# Patient Record
Sex: MALE | Race: WHITE | URBAN - NONMETROPOLITAN AREA
[De-identification: names, ages, dates, MRNs, and addresses within clinical notes are randomized per-mention and may not be internally consistent; named-entity substitution may affect disease eponyms.]

---

## 2017-01-01 ENCOUNTER — APPOINTMENT (OUTPATIENT)
Dept: SPEECH THERAPY | Facility: HOSPITAL | Age: 82
DRG: 193 | End: 2017-01-01
Payer: MEDICARE

## 2017-01-01 ENCOUNTER — APPOINTMENT (OUTPATIENT)
Dept: GENERAL RADIOLOGY | Facility: HOSPITAL | Age: 82
DRG: 193 | End: 2017-01-01
Payer: MEDICARE

## 2017-01-01 ENCOUNTER — TRANSFERRED RECORDS (OUTPATIENT)
Dept: HEALTH INFORMATION MANAGEMENT | Facility: HOSPITAL | Age: 82
End: 2017-01-01

## 2017-01-01 ENCOUNTER — HOSPITAL ENCOUNTER (INPATIENT)
Facility: HOSPITAL | Age: 82
LOS: 11 days | Discharge: ANOTHER HOSPITAL | DRG: 193 | End: 2017-03-24
Attending: PHYSICIAN ASSISTANT | Admitting: INTERNAL MEDICINE
Payer: MEDICARE

## 2017-01-01 ENCOUNTER — APPOINTMENT (OUTPATIENT)
Dept: SPEECH THERAPY | Facility: HOSPITAL | Age: 82
DRG: 193 | End: 2017-01-01
Attending: INTERNAL MEDICINE
Payer: MEDICARE

## 2017-01-01 VITALS
HEART RATE: 80 BPM | HEIGHT: 70 IN | DIASTOLIC BLOOD PRESSURE: 51 MMHG | OXYGEN SATURATION: 90 % | BODY MASS INDEX: 12.37 KG/M2 | SYSTOLIC BLOOD PRESSURE: 99 MMHG | WEIGHT: 86.42 LBS | TEMPERATURE: 99.2 F | RESPIRATION RATE: 18 BRPM

## 2017-01-01 DIAGNOSIS — J43.9 EMPHYSEMA, UNSPECIFIED (H): ICD-10-CM

## 2017-01-01 DIAGNOSIS — E46 MALNUTRITION (H): ICD-10-CM

## 2017-01-01 DIAGNOSIS — J15.9 COMMUNITY ACQUIRED BACTERIAL PNEUMONIA: ICD-10-CM

## 2017-01-01 LAB
ACID FAST STN SPEC QL: NORMAL
ALBUMIN SERPL-MCNC: 1.3 G/DL (ref 3.4–5)
ALBUMIN SERPL-MCNC: 1.5 G/DL (ref 3.4–5)
ALBUMIN SERPL-MCNC: 2 G/DL (ref 3.4–5)
ALBUMIN UR-MCNC: 30 MG/DL
ALP SERPL-CCNC: 60 U/L (ref 40–150)
ALP SERPL-CCNC: 74 U/L (ref 40–150)
ALP SERPL-CCNC: 94 U/L (ref 40–150)
ALT SERPL W P-5'-P-CCNC: 11 U/L (ref 0–70)
ALT SERPL W P-5'-P-CCNC: 12 U/L (ref 0–70)
ALT SERPL W P-5'-P-CCNC: 8 U/L (ref 0–70)
ANION GAP SERPL CALCULATED.3IONS-SCNC: 2 MMOL/L (ref 3–14)
ANION GAP SERPL CALCULATED.3IONS-SCNC: 2 MMOL/L (ref 3–14)
ANION GAP SERPL CALCULATED.3IONS-SCNC: 3 MMOL/L (ref 3–14)
ANION GAP SERPL CALCULATED.3IONS-SCNC: 5 MMOL/L (ref 3–14)
ANION GAP SERPL CALCULATED.3IONS-SCNC: 8 MMOL/L (ref 3–14)
APPEARANCE UR: ABNORMAL
APTT PPP: 31 SEC (ref 24–37)
APTT PPP: 32 SEC (ref 24–37)
AST SERPL W P-5'-P-CCNC: 13 U/L (ref 0–45)
AST SERPL W P-5'-P-CCNC: 13 U/L (ref 0–45)
AST SERPL W P-5'-P-CCNC: 16 U/L (ref 0–45)
B DERMAT AB SER QL ID: NORMAL
BACTERIA #/AREA URNS HPF: ABNORMAL /HPF
BACTERIA SPEC CULT: ABNORMAL
BACTERIA SPEC CULT: ABNORMAL
BACTERIA SPEC CULT: NORMAL
BASOPHILS # BLD AUTO: 0 10E9/L (ref 0–0.2)
BASOPHILS NFR BLD AUTO: 0 %
BASOPHILS NFR BLD AUTO: 0.1 %
BASOPHILS NFR BLD AUTO: 0.2 %
BASOPHILS NFR BLD AUTO: 0.3 %
BASOPHILS NFR BLD AUTO: 0.3 %
BILIRUB DIRECT SERPL-MCNC: <0.1 MG/DL (ref 0–0.2)
BILIRUB SERPL-MCNC: 0.1 MG/DL (ref 0.2–1.3)
BILIRUB SERPL-MCNC: 0.1 MG/DL (ref 0.2–1.3)
BILIRUB SERPL-MCNC: 0.3 MG/DL (ref 0.2–1.3)
BILIRUB UR QL STRIP: NEGATIVE
BUN SERPL-MCNC: 10 MG/DL (ref 7–30)
BUN SERPL-MCNC: 12 MG/DL (ref 7–30)
BUN SERPL-MCNC: 25 MG/DL (ref 7–30)
BUN SERPL-MCNC: 34 MG/DL (ref 7–30)
BUN SERPL-MCNC: 9 MG/DL (ref 7–30)
CALCIUM SERPL-MCNC: 8 MG/DL (ref 8.5–10.1)
CALCIUM SERPL-MCNC: 8.1 MG/DL (ref 8.5–10.1)
CALCIUM SERPL-MCNC: 8.2 MG/DL (ref 8.5–10.1)
CALCIUM SERPL-MCNC: 8.2 MG/DL (ref 8.5–10.1)
CALCIUM SERPL-MCNC: 8.5 MG/DL (ref 8.5–10.1)
CHLORIDE SERPL-SCNC: 100 MMOL/L (ref 94–109)
CHLORIDE SERPL-SCNC: 100 MMOL/L (ref 94–109)
CHLORIDE SERPL-SCNC: 106 MMOL/L (ref 94–109)
CHLORIDE SERPL-SCNC: 92 MMOL/L (ref 94–109)
CHLORIDE SERPL-SCNC: 93 MMOL/L (ref 94–109)
CK SERPL-CCNC: 32 U/L (ref 30–300)
CO2 SERPL-SCNC: 33 MMOL/L (ref 20–32)
CO2 SERPL-SCNC: 34 MMOL/L (ref 20–32)
CO2 SERPL-SCNC: 38 MMOL/L (ref 20–32)
CO2 SERPL-SCNC: 38 MMOL/L (ref 20–32)
CO2 SERPL-SCNC: 39 MMOL/L (ref 20–32)
COLOR UR AUTO: YELLOW
COPATH REPORT: NORMAL
CREAT SERPL-MCNC: 0.43 MG/DL (ref 0.66–1.25)
CREAT SERPL-MCNC: 0.45 MG/DL (ref 0.66–1.25)
CREAT SERPL-MCNC: 0.47 MG/DL (ref 0.66–1.25)
CREAT SERPL-MCNC: 0.51 MG/DL (ref 0.66–1.25)
CREAT SERPL-MCNC: 0.58 MG/DL (ref 0.66–1.25)
DIFFERENTIAL METHOD BLD: ABNORMAL
EOSINOPHIL # BLD AUTO: 0 10E9/L (ref 0–0.7)
EOSINOPHIL # BLD AUTO: 0.1 10E9/L (ref 0–0.7)
EOSINOPHIL NFR BLD AUTO: 0 %
EOSINOPHIL NFR BLD AUTO: 0.2 %
EOSINOPHIL NFR BLD AUTO: 0.3 %
EOSINOPHIL NFR BLD AUTO: 0.3 %
EOSINOPHIL NFR BLD AUTO: 0.4 %
EOSINOPHIL NFR BLD AUTO: 0.8 %
ERYTHROCYTE [DISTWIDTH] IN BLOOD BY AUTOMATED COUNT: 15.9 % (ref 10–15)
ERYTHROCYTE [DISTWIDTH] IN BLOOD BY AUTOMATED COUNT: 16 % (ref 10–15)
ERYTHROCYTE [DISTWIDTH] IN BLOOD BY AUTOMATED COUNT: 16 % (ref 10–15)
ERYTHROCYTE [DISTWIDTH] IN BLOOD BY AUTOMATED COUNT: 16.1 % (ref 10–15)
ERYTHROCYTE [DISTWIDTH] IN BLOOD BY AUTOMATED COUNT: 16.2 % (ref 10–15)
ERYTHROCYTE [DISTWIDTH] IN BLOOD BY AUTOMATED COUNT: 16.2 % (ref 10–15)
FERRITIN SERPL-MCNC: 286 NG/ML (ref 26–388)
GFR SERPL CREATININE-BSD FRML MDRD: ABNORMAL ML/MIN/1.7M2
GLUCOSE BLDC GLUCOMTR-MCNC: 125 MG/DL (ref 70–99)
GLUCOSE SERPL-MCNC: 101 MG/DL (ref 70–99)
GLUCOSE SERPL-MCNC: 109 MG/DL (ref 70–99)
GLUCOSE SERPL-MCNC: 119 MG/DL (ref 70–99)
GLUCOSE SERPL-MCNC: 87 MG/DL (ref 70–99)
GLUCOSE SERPL-MCNC: 89 MG/DL (ref 70–99)
GLUCOSE UR STRIP-MCNC: NEGATIVE MG/DL
GRAM STN SPEC: ABNORMAL
HCT VFR BLD AUTO: 25.5 % (ref 40–53)
HCT VFR BLD AUTO: 25.9 % (ref 40–53)
HCT VFR BLD AUTO: 26 % (ref 40–53)
HCT VFR BLD AUTO: 26.3 % (ref 40–53)
HCT VFR BLD AUTO: 27.3 % (ref 40–53)
HCT VFR BLD AUTO: 27.4 % (ref 40–53)
HCT VFR BLD AUTO: 27.8 % (ref 40–53)
HCT VFR BLD AUTO: 27.9 % (ref 40–53)
HCT VFR BLD AUTO: 28.8 % (ref 40–53)
HCT VFR BLD AUTO: 29.7 % (ref 40–53)
HCT VFR BLD AUTO: 33 % (ref 40–53)
HCT VFR BLD AUTO: 34.5 % (ref 40–53)
HGB BLD-MCNC: 10 G/DL (ref 13.3–17.7)
HGB BLD-MCNC: 7.4 G/DL (ref 13.3–17.7)
HGB BLD-MCNC: 7.5 G/DL (ref 13.3–17.7)
HGB BLD-MCNC: 8 G/DL (ref 13.3–17.7)
HGB BLD-MCNC: 8 G/DL (ref 13.3–17.7)
HGB BLD-MCNC: 8.2 G/DL (ref 13.3–17.7)
HGB BLD-MCNC: 8.2 G/DL (ref 13.3–17.7)
HGB BLD-MCNC: 8.5 G/DL (ref 13.3–17.7)
HGB BLD-MCNC: 8.6 G/DL (ref 13.3–17.7)
HGB BLD-MCNC: 9.4 G/DL (ref 13.3–17.7)
HGB UR QL STRIP: ABNORMAL
HIV 1+2 AB+HIV1 P24 AG SERPL QL IA: NORMAL
IMM GRANULOCYTES # BLD: 0 10E9/L (ref 0–0.4)
IMM GRANULOCYTES # BLD: 0.1 10E9/L (ref 0–0.4)
IMM GRANULOCYTES # BLD: 0.1 10E9/L (ref 0–0.4)
IMM GRANULOCYTES NFR BLD: 0.2 %
IMM GRANULOCYTES NFR BLD: 0.3 %
IMM GRANULOCYTES NFR BLD: 0.4 %
IMM GRANULOCYTES NFR BLD: 0.5 %
INR PPP: 1.25 (ref 0.8–1.2)
INR PPP: 1.3 (ref 0.8–1.2)
IRON SATN MFR SERPL: 11 % (ref 15–46)
IRON SERPL-MCNC: 12 UG/DL (ref 35–180)
KETONES UR STRIP-MCNC: NEGATIVE MG/DL
L PNEUMO1 AG UR QL IA: NORMAL
LAB SCANNED RESULT: NORMAL
LACTATE SERPL-SCNC: 1.1 MMOL/L (ref 0.4–2)
LEUKOCYTE ESTERASE UR QL STRIP: ABNORMAL
LYMPHOCYTES # BLD AUTO: 0.6 10E9/L (ref 0.8–5.3)
LYMPHOCYTES # BLD AUTO: 0.7 10E9/L (ref 0.8–5.3)
LYMPHOCYTES # BLD AUTO: 0.8 10E9/L (ref 0.8–5.3)
LYMPHOCYTES # BLD AUTO: 0.9 10E9/L (ref 0.8–5.3)
LYMPHOCYTES # BLD AUTO: 1 10E9/L (ref 0.8–5.3)
LYMPHOCYTES NFR BLD AUTO: 10.3 %
LYMPHOCYTES NFR BLD AUTO: 10.7 %
LYMPHOCYTES NFR BLD AUTO: 12.4 %
LYMPHOCYTES NFR BLD AUTO: 12.5 %
LYMPHOCYTES NFR BLD AUTO: 12.7 %
LYMPHOCYTES NFR BLD AUTO: 13.6 %
LYMPHOCYTES NFR BLD AUTO: 13.7 %
LYMPHOCYTES NFR BLD AUTO: 5.7 %
LYMPHOCYTES NFR BLD AUTO: 6.7 %
LYMPHOCYTES NFR BLD AUTO: 7.2 %
Lab: NORMAL
M TB DNA SPEC QL NAA+PROBE: NORMAL
M TB TUBERC IFN-G BLD QL: NEGATIVE
M TB TUBERC IFN-G/MITOGEN IGNF BLD: 0 IU/ML
MAGNESIUM SERPL-MCNC: 2.4 MG/DL (ref 1.6–2.3)
MCH RBC QN AUTO: 24.9 PG (ref 26.5–33)
MCH RBC QN AUTO: 25.1 PG (ref 26.5–33)
MCH RBC QN AUTO: 25.2 PG (ref 26.5–33)
MCH RBC QN AUTO: 25.3 PG (ref 26.5–33)
MCH RBC QN AUTO: 25.5 PG (ref 26.5–33)
MCH RBC QN AUTO: 25.6 PG (ref 26.5–33)
MCH RBC QN AUTO: 25.6 PG (ref 26.5–33)
MCH RBC QN AUTO: 25.7 PG (ref 26.5–33)
MCH RBC QN AUTO: 25.7 PG (ref 26.5–33)
MCH RBC QN AUTO: 25.8 PG (ref 26.5–33)
MCH RBC QN AUTO: 26 PG (ref 26.5–33)
MCH RBC QN AUTO: 26.1 PG (ref 26.5–33)
MCHC RBC AUTO-ENTMCNC: 28.5 G/DL (ref 31.5–36.5)
MCHC RBC AUTO-ENTMCNC: 28.5 G/DL (ref 31.5–36.5)
MCHC RBC AUTO-ENTMCNC: 28.6 G/DL (ref 31.5–36.5)
MCHC RBC AUTO-ENTMCNC: 28.6 G/DL (ref 31.5–36.5)
MCHC RBC AUTO-ENTMCNC: 28.8 G/DL (ref 31.5–36.5)
MCHC RBC AUTO-ENTMCNC: 29 G/DL (ref 31.5–36.5)
MCHC RBC AUTO-ENTMCNC: 29.2 G/DL (ref 31.5–36.5)
MCHC RBC AUTO-ENTMCNC: 29.3 G/DL (ref 31.5–36.5)
MCHC RBC AUTO-ENTMCNC: 29.4 G/DL (ref 31.5–36.5)
MCHC RBC AUTO-ENTMCNC: 29.4 G/DL (ref 31.5–36.5)
MCHC RBC AUTO-ENTMCNC: 29.5 G/DL (ref 31.5–36.5)
MCHC RBC AUTO-ENTMCNC: 29.9 G/DL (ref 31.5–36.5)
MCV RBC AUTO: 85 FL (ref 78–100)
MCV RBC AUTO: 86 FL (ref 78–100)
MCV RBC AUTO: 86 FL (ref 78–100)
MCV RBC AUTO: 87 FL (ref 78–100)
MCV RBC AUTO: 87 FL (ref 78–100)
MCV RBC AUTO: 88 FL (ref 78–100)
MCV RBC AUTO: 89 FL (ref 78–100)
MCV RBC AUTO: 90 FL (ref 78–100)
MICRO REPORT STATUS: ABNORMAL
MICRO REPORT STATUS: NORMAL
MONOCYTES # BLD AUTO: 0.4 10E9/L (ref 0–1.3)
MONOCYTES # BLD AUTO: 0.4 10E9/L (ref 0–1.3)
MONOCYTES # BLD AUTO: 0.5 10E9/L (ref 0–1.3)
MONOCYTES # BLD AUTO: 0.6 10E9/L (ref 0–1.3)
MONOCYTES # BLD AUTO: 0.8 10E9/L (ref 0–1.3)
MONOCYTES NFR BLD AUTO: 4.7 %
MONOCYTES NFR BLD AUTO: 5.6 %
MONOCYTES NFR BLD AUTO: 6.1 %
MONOCYTES NFR BLD AUTO: 6.5 %
MONOCYTES NFR BLD AUTO: 7 %
MONOCYTES NFR BLD AUTO: 7.1 %
MONOCYTES NFR BLD AUTO: 7.3 %
MONOCYTES NFR BLD AUTO: 7.4 %
MONOCYTES NFR BLD AUTO: 7.7 %
MONOCYTES NFR BLD AUTO: 7.7 %
MUCOUS THREADS #/AREA URNS LPF: PRESENT /LPF
NEUTROPHILS # BLD AUTO: 10.1 10E9/L (ref 1.6–8.3)
NEUTROPHILS # BLD AUTO: 4.3 10E9/L (ref 1.6–8.3)
NEUTROPHILS # BLD AUTO: 4.8 10E9/L (ref 1.6–8.3)
NEUTROPHILS # BLD AUTO: 5 10E9/L (ref 1.6–8.3)
NEUTROPHILS # BLD AUTO: 5.1 10E9/L (ref 1.6–8.3)
NEUTROPHILS # BLD AUTO: 5.7 10E9/L (ref 1.6–8.3)
NEUTROPHILS # BLD AUTO: 5.9 10E9/L (ref 1.6–8.3)
NEUTROPHILS # BLD AUTO: 6 10E9/L (ref 1.6–8.3)
NEUTROPHILS # BLD AUTO: 8 10E9/L (ref 1.6–8.3)
NEUTROPHILS # BLD AUTO: 9.2 10E9/L (ref 1.6–8.3)
NEUTROPHILS NFR BLD AUTO: 77.7 %
NEUTROPHILS NFR BLD AUTO: 78.6 %
NEUTROPHILS NFR BLD AUTO: 79.1 %
NEUTROPHILS NFR BLD AUTO: 79.4 %
NEUTROPHILS NFR BLD AUTO: 79.5 %
NEUTROPHILS NFR BLD AUTO: 80.5 %
NEUTROPHILS NFR BLD AUTO: 82.3 %
NEUTROPHILS NFR BLD AUTO: 86.7 %
NEUTROPHILS NFR BLD AUTO: 86.9 %
NEUTROPHILS NFR BLD AUTO: 87.3 %
NITRATE UR QL: POSITIVE
NRBC # BLD AUTO: 0 10*3/UL
NRBC BLD AUTO-RTO: 0 /100
PH UR STRIP: 5.5 PH (ref 4.7–8)
PHOSPHATE SERPL-MCNC: 3.1 MG/DL (ref 2.5–4.5)
PLATELET # BLD AUTO: 199 10E9/L (ref 150–450)
PLATELET # BLD AUTO: 204 10E9/L (ref 150–450)
PLATELET # BLD AUTO: 210 10E9/L (ref 150–450)
PLATELET # BLD AUTO: 232 10E9/L (ref 150–450)
PLATELET # BLD AUTO: 236 10E9/L (ref 150–450)
PLATELET # BLD AUTO: 251 10E9/L (ref 150–450)
PLATELET # BLD AUTO: 257 10E9/L (ref 150–450)
PLATELET # BLD AUTO: 257 10E9/L (ref 150–450)
PLATELET # BLD AUTO: 281 10E9/L (ref 150–450)
PLATELET # BLD AUTO: 287 10E9/L (ref 150–450)
PLATELET # BLD AUTO: 297 10E9/L (ref 150–450)
PLATELET # BLD AUTO: 324 10E9/L (ref 150–450)
POTASSIUM SERPL-SCNC: 4 MMOL/L (ref 3.4–5.3)
POTASSIUM SERPL-SCNC: 4.3 MMOL/L (ref 3.4–5.3)
POTASSIUM SERPL-SCNC: 5.1 MMOL/L (ref 3.4–5.3)
PROCALCITONIN SERPL-MCNC: 1.3 NG/ML
PROT SERPL-MCNC: 5.7 G/DL (ref 6.8–8.8)
PROT SERPL-MCNC: 6.4 G/DL (ref 6.8–8.8)
PROT SERPL-MCNC: 8 G/DL (ref 6.8–8.8)
RBC # BLD AUTO: 2.88 10E12/L (ref 4.4–5.9)
RBC # BLD AUTO: 2.89 10E12/L (ref 4.4–5.9)
RBC # BLD AUTO: 2.94 10E12/L (ref 4.4–5.9)
RBC # BLD AUTO: 3.01 10E12/L (ref 4.4–5.9)
RBC # BLD AUTO: 3.13 10E12/L (ref 4.4–5.9)
RBC # BLD AUTO: 3.14 10E12/L (ref 4.4–5.9)
RBC # BLD AUTO: 3.19 10E12/L (ref 4.4–5.9)
RBC # BLD AUTO: 3.2 10E12/L (ref 4.4–5.9)
RBC # BLD AUTO: 3.34 10E12/L (ref 4.4–5.9)
RBC # BLD AUTO: 3.37 10E12/L (ref 4.4–5.9)
RBC # BLD AUTO: 3.71 10E12/L (ref 4.4–5.9)
RBC # BLD AUTO: 3.87 10E12/L (ref 4.4–5.9)
RBC #/AREA URNS AUTO: 71 /HPF (ref 0–2)
S PNEUM AG SPEC QL: NORMAL
SODIUM SERPL-SCNC: 133 MMOL/L (ref 133–144)
SODIUM SERPL-SCNC: 134 MMOL/L (ref 133–144)
SODIUM SERPL-SCNC: 140 MMOL/L (ref 133–144)
SODIUM SERPL-SCNC: 142 MMOL/L (ref 133–144)
SODIUM SERPL-SCNC: 144 MMOL/L (ref 133–144)
SP GR UR STRIP: 1.04 (ref 1–1.03)
SPECIMEN SOURCE: ABNORMAL
SPECIMEN SOURCE: NORMAL
TIBC SERPL-MCNC: 112 UG/DL (ref 240–430)
TSH SERPL DL<=0.05 MIU/L-ACNC: 3.34 MU/L (ref 0.4–4)
URN SPEC COLLECT METH UR: ABNORMAL
UROBILINOGEN UR STRIP-MCNC: NORMAL MG/DL (ref 0–2)
WBC # BLD AUTO: 10.5 10E9/L (ref 4–11)
WBC # BLD AUTO: 11.7 10E9/L (ref 4–11)
WBC # BLD AUTO: 5.6 10E9/L (ref 4–11)
WBC # BLD AUTO: 6.1 10E9/L (ref 4–11)
WBC # BLD AUTO: 6.3 10E9/L (ref 4–11)
WBC # BLD AUTO: 6.5 10E9/L (ref 4–11)
WBC # BLD AUTO: 7.1 10E9/L (ref 4–11)
WBC # BLD AUTO: 7.2 10E9/L (ref 4–11)
WBC # BLD AUTO: 7.2 10E9/L (ref 4–11)
WBC # BLD AUTO: 7.6 10E9/L (ref 4–11)
WBC # BLD AUTO: 8 10E9/L (ref 4–11)
WBC # BLD AUTO: 9.2 10E9/L (ref 4–11)
WBC #/AREA URNS AUTO: 60 /HPF (ref 0–2)
WBC CLUMPS #/AREA URNS HPF: PRESENT /HPF

## 2017-01-01 PROCEDURE — 94640 AIRWAY INHALATION TREATMENT: CPT

## 2017-01-01 PROCEDURE — 36415 COLL VENOUS BLD VENIPUNCTURE: CPT | Performed by: INTERNAL MEDICINE

## 2017-01-01 PROCEDURE — 94640 AIRWAY INHALATION TREATMENT: CPT | Mod: 76

## 2017-01-01 PROCEDURE — 25000132 ZZH RX MED GY IP 250 OP 250 PS 637: Mod: GY | Performed by: INTERNAL MEDICINE

## 2017-01-01 PROCEDURE — 25800025 ZZH RX 258: Performed by: INTERNAL MEDICINE

## 2017-01-01 PROCEDURE — 71260 CT THORAX DX C+: CPT | Mod: TC

## 2017-01-01 PROCEDURE — 92526 ORAL FUNCTION THERAPY: CPT | Mod: GN

## 2017-01-01 PROCEDURE — 40000225 ZZH STATISTIC SLP WARD VISIT

## 2017-01-01 PROCEDURE — 87385 HISTOPLASMA CAPSUL AG IA: CPT | Performed by: INTERNAL MEDICINE

## 2017-01-01 PROCEDURE — 40000275 ZZH STATISTIC RCP TIME EA 10 MIN

## 2017-01-01 PROCEDURE — 96374 THER/PROPH/DIAG INJ IV PUSH: CPT

## 2017-01-01 PROCEDURE — 74177 CT ABD & PELVIS W/CONTRAST: CPT | Mod: TC

## 2017-01-01 PROCEDURE — 99223 1ST HOSP IP/OBS HIGH 75: CPT | Performed by: INTERNAL MEDICINE

## 2017-01-01 PROCEDURE — 12000000 ZZH R&B MED SURG/OB

## 2017-01-01 PROCEDURE — 85025 COMPLETE CBC W/AUTO DIFF WBC: CPT | Performed by: INTERNAL MEDICINE

## 2017-01-01 PROCEDURE — 99232 SBSQ HOSP IP/OBS MODERATE 35: CPT | Performed by: INTERNAL MEDICINE

## 2017-01-01 PROCEDURE — 80053 COMPREHEN METABOLIC PANEL: CPT | Performed by: INTERNAL MEDICINE

## 2017-01-01 PROCEDURE — 80048 BASIC METABOLIC PNL TOTAL CA: CPT | Performed by: FAMILY MEDICINE

## 2017-01-01 PROCEDURE — 88173 CYTOPATH EVAL FNA REPORT: CPT | Mod: TC | Performed by: INTERNAL MEDICINE

## 2017-01-01 PROCEDURE — 85610 PROTHROMBIN TIME: CPT | Performed by: INTERNAL MEDICINE

## 2017-01-01 PROCEDURE — 25000128 H RX IP 250 OP 636: Performed by: INTERNAL MEDICINE

## 2017-01-01 PROCEDURE — 25000125 ZZHC RX 250: Performed by: INTERNAL MEDICINE

## 2017-01-01 PROCEDURE — 20000003 ZZH R&B ICU

## 2017-01-01 PROCEDURE — 00000146 ZZHCL STATISTIC GLUCOSE BY METER IP

## 2017-01-01 PROCEDURE — 96361 HYDRATE IV INFUSION ADD-ON: CPT

## 2017-01-01 PROCEDURE — 86480 TB TEST CELL IMMUN MEASURE: CPT | Performed by: INTERNAL MEDICINE

## 2017-01-01 PROCEDURE — 85025 COMPLETE CBC W/AUTO DIFF WBC: CPT | Performed by: FAMILY MEDICINE

## 2017-01-01 PROCEDURE — 84145 PROCALCITONIN (PCT): CPT | Performed by: INTERNAL MEDICINE

## 2017-01-01 PROCEDURE — 87040 BLOOD CULTURE FOR BACTERIA: CPT | Performed by: PHYSICIAN ASSISTANT

## 2017-01-01 PROCEDURE — 77012 CT SCAN FOR NEEDLE BIOPSY: CPT | Mod: TC

## 2017-01-01 PROCEDURE — 0B9G3ZX DRAINAGE OF LEFT UPPER LUNG LOBE, PERCUTANEOUS APPROACH, DIAGNOSTIC: ICD-10-PCS | Performed by: RADIOLOGY

## 2017-01-01 PROCEDURE — 87556 M.TUBERCULO DNA AMP PROBE: CPT | Performed by: INTERNAL MEDICINE

## 2017-01-01 PROCEDURE — 36415 COLL VENOUS BLD VENIPUNCTURE: CPT | Performed by: PHYSICIAN ASSISTANT

## 2017-01-01 PROCEDURE — 80076 HEPATIC FUNCTION PANEL: CPT | Performed by: INTERNAL MEDICINE

## 2017-01-01 PROCEDURE — 40000786 ZZHCL STATISTIC ACTIVE MRSA SURVEILLANCE CULTURE: Performed by: INTERNAL MEDICINE

## 2017-01-01 PROCEDURE — 80048 BASIC METABOLIC PNL TOTAL CA: CPT | Performed by: INTERNAL MEDICINE

## 2017-01-01 PROCEDURE — 85730 THROMBOPLASTIN TIME PARTIAL: CPT | Performed by: INTERNAL MEDICINE

## 2017-01-01 PROCEDURE — 99239 HOSP IP/OBS DSCHRG MGMT >30: CPT | Performed by: INTERNAL MEDICINE

## 2017-01-01 PROCEDURE — 87899 AGENT NOS ASSAY W/OPTIC: CPT | Performed by: INTERNAL MEDICINE

## 2017-01-01 PROCEDURE — 71010 ZZHC CHEST ONE VIEW: CPT | Mod: TC

## 2017-01-01 PROCEDURE — 40000832 H STATISTIC POST OPERATIVE IMAGING: Mod: TC

## 2017-01-01 PROCEDURE — 87106 FUNGI IDENTIFICATION YEAST: CPT | Performed by: INTERNAL MEDICINE

## 2017-01-01 PROCEDURE — 83550 IRON BINDING TEST: CPT | Performed by: INTERNAL MEDICINE

## 2017-01-01 PROCEDURE — 32405 ZZHC BIOPSY LUNG/MEDIASTINUM PERCUTANEOUS: CPT | Mod: TC

## 2017-01-01 PROCEDURE — 84443 ASSAY THYROID STIM HORMONE: CPT | Performed by: INTERNAL MEDICINE

## 2017-01-01 PROCEDURE — 83605 ASSAY OF LACTIC ACID: CPT | Performed by: INTERNAL MEDICINE

## 2017-01-01 PROCEDURE — 87086 URINE CULTURE/COLONY COUNT: CPT | Performed by: INTERNAL MEDICINE

## 2017-01-01 PROCEDURE — 87206 SMEAR FLUORESCENT/ACID STAI: CPT | Performed by: INTERNAL MEDICINE

## 2017-01-01 PROCEDURE — 87070 CULTURE OTHR SPECIMN AEROBIC: CPT | Performed by: INTERNAL MEDICINE

## 2017-01-01 PROCEDURE — 36416 COLLJ CAPILLARY BLOOD SPEC: CPT | Performed by: FAMILY MEDICINE

## 2017-01-01 PROCEDURE — 99285 EMERGENCY DEPT VISIT HI MDM: CPT | Performed by: PHYSICIAN ASSISTANT

## 2017-01-01 PROCEDURE — 82728 ASSAY OF FERRITIN: CPT | Performed by: INTERNAL MEDICINE

## 2017-01-01 PROCEDURE — 00000155 ZZHCL STATISTIC H-CELL BLOCK W/STAIN: Performed by: INTERNAL MEDICINE

## 2017-01-01 PROCEDURE — 85027 COMPLETE CBC AUTOMATED: CPT | Performed by: INTERNAL MEDICINE

## 2017-01-01 PROCEDURE — 36415 COLL VENOUS BLD VENIPUNCTURE: CPT | Performed by: RADIOLOGY

## 2017-01-01 PROCEDURE — 81001 URINALYSIS AUTO W/SCOPE: CPT | Performed by: PHYSICIAN ASSISTANT

## 2017-01-01 PROCEDURE — 92610 EVALUATE SWALLOWING FUNCTION: CPT | Mod: GN

## 2017-01-01 PROCEDURE — 86612 BLASTOMYCES ANTIBODY: CPT | Performed by: INTERNAL MEDICINE

## 2017-01-01 PROCEDURE — 88305 TISSUE EXAM BY PATHOLOGIST: CPT | Mod: TC | Performed by: INTERNAL MEDICINE

## 2017-01-01 PROCEDURE — 83540 ASSAY OF IRON: CPT | Performed by: INTERNAL MEDICINE

## 2017-01-01 PROCEDURE — 99285 EMERGENCY DEPT VISIT HI MDM: CPT | Mod: 25

## 2017-01-01 PROCEDURE — 84100 ASSAY OF PHOSPHORUS: CPT | Performed by: INTERNAL MEDICINE

## 2017-01-01 PROCEDURE — 87205 SMEAR GRAM STAIN: CPT | Performed by: INTERNAL MEDICINE

## 2017-01-01 PROCEDURE — 25000128 H RX IP 250 OP 636: Performed by: PHYSICIAN ASSISTANT

## 2017-01-01 PROCEDURE — 85730 THROMBOPLASTIN TIME PARTIAL: CPT | Performed by: RADIOLOGY

## 2017-01-01 PROCEDURE — 82550 ASSAY OF CK (CPK): CPT | Performed by: INTERNAL MEDICINE

## 2017-01-01 PROCEDURE — 25000125 ZZHC RX 250

## 2017-01-01 PROCEDURE — 87116 MYCOBACTERIA CULTURE: CPT | Performed by: INTERNAL MEDICINE

## 2017-01-01 PROCEDURE — 87389 HIV-1 AG W/HIV-1&-2 AB AG IA: CPT | Performed by: INTERNAL MEDICINE

## 2017-01-01 PROCEDURE — 36416 COLLJ CAPILLARY BLOOD SPEC: CPT | Performed by: INTERNAL MEDICINE

## 2017-01-01 PROCEDURE — 83735 ASSAY OF MAGNESIUM: CPT | Performed by: INTERNAL MEDICINE

## 2017-01-01 RX ORDER — LIDOCAINE HYDROCHLORIDE 10 MG/ML
INJECTION, SOLUTION EPIDURAL; INFILTRATION; INTRACAUDAL; PERINEURAL
Status: COMPLETED
Start: 2017-01-01 | End: 2017-01-01

## 2017-01-01 RX ORDER — DEXTROSE MONOHYDRATE, SODIUM CHLORIDE, AND POTASSIUM CHLORIDE 50; 1.49; 4.5 G/1000ML; G/1000ML; G/1000ML
INJECTION, SOLUTION INTRAVENOUS CONTINUOUS
Status: DISCONTINUED | OUTPATIENT
Start: 2017-01-01 | End: 2017-01-01 | Stop reason: HOSPADM

## 2017-01-01 RX ORDER — IOPAMIDOL 612 MG/ML
100 INJECTION, SOLUTION INTRAVASCULAR ONCE
Status: COMPLETED | OUTPATIENT
Start: 2017-01-01 | End: 2017-01-01

## 2017-01-01 RX ORDER — ONDANSETRON 2 MG/ML
4 INJECTION INTRAMUSCULAR; INTRAVENOUS EVERY 6 HOURS PRN
Status: DISCONTINUED | OUTPATIENT
Start: 2017-01-01 | End: 2017-01-01 | Stop reason: HOSPADM

## 2017-01-01 RX ORDER — NALOXONE HYDROCHLORIDE 0.4 MG/ML
.1-.4 INJECTION, SOLUTION INTRAMUSCULAR; INTRAVENOUS; SUBCUTANEOUS
Status: DISCONTINUED | OUTPATIENT
Start: 2017-01-01 | End: 2017-01-01 | Stop reason: HOSPADM

## 2017-01-01 RX ORDER — IPRATROPIUM BROMIDE AND ALBUTEROL SULFATE 2.5; .5 MG/3ML; MG/3ML
3 SOLUTION RESPIRATORY (INHALATION) EVERY 4 HOURS PRN
Status: DISCONTINUED | OUTPATIENT
Start: 2017-01-01 | End: 2017-01-01 | Stop reason: HOSPADM

## 2017-01-01 RX ORDER — MULTIPLE VITAMINS W/ MINERALS TAB 9MG-400MCG
1 TAB ORAL DAILY
Status: DISCONTINUED | OUTPATIENT
Start: 2017-01-01 | End: 2017-01-01 | Stop reason: HOSPADM

## 2017-01-01 RX ORDER — IPRATROPIUM BROMIDE AND ALBUTEROL SULFATE 2.5; .5 MG/3ML; MG/3ML
3 SOLUTION RESPIRATORY (INHALATION)
Status: DISCONTINUED | OUTPATIENT
Start: 2017-01-01 | End: 2017-01-01

## 2017-01-01 RX ORDER — LIDOCAINE 40 MG/G
CREAM TOPICAL
Status: DISCONTINUED | OUTPATIENT
Start: 2017-01-01 | End: 2017-01-01 | Stop reason: HOSPADM

## 2017-01-01 RX ORDER — ACETAMINOPHEN 325 MG/1
650 TABLET ORAL EVERY 4 HOURS PRN
Status: DISCONTINUED | OUTPATIENT
Start: 2017-01-01 | End: 2017-01-01 | Stop reason: HOSPADM

## 2017-01-01 RX ORDER — CEFTRIAXONE SODIUM 2 G/50ML
2 INJECTION, SOLUTION INTRAVENOUS ONCE
Status: COMPLETED | OUTPATIENT
Start: 2017-01-01 | End: 2017-01-01

## 2017-01-01 RX ORDER — LIDOCAINE 40 MG/G
CREAM TOPICAL
Status: DISCONTINUED | OUTPATIENT
Start: 2017-01-01 | End: 2017-01-01

## 2017-01-01 RX ORDER — SODIUM CHLORIDE 9 MG/ML
1000 INJECTION, SOLUTION INTRAVENOUS CONTINUOUS
Status: DISCONTINUED | OUTPATIENT
Start: 2017-01-01 | End: 2017-01-01

## 2017-01-01 RX ORDER — CEFTRIAXONE SODIUM 1 G/50ML
1 INJECTION, SOLUTION INTRAVENOUS EVERY 24 HOURS
Status: DISCONTINUED | OUTPATIENT
Start: 2017-01-01 | End: 2017-01-01

## 2017-01-01 RX ORDER — HEPARIN SODIUM 5000 [USP'U]/.5ML
5000 INJECTION, SOLUTION INTRAVENOUS; SUBCUTANEOUS EVERY 12 HOURS
Status: DISCONTINUED | OUTPATIENT
Start: 2017-01-01 | End: 2017-01-01 | Stop reason: HOSPADM

## 2017-01-01 RX ORDER — ONDANSETRON 4 MG/1
4 TABLET, ORALLY DISINTEGRATING ORAL EVERY 6 HOURS PRN
Status: DISCONTINUED | OUTPATIENT
Start: 2017-01-01 | End: 2017-01-01 | Stop reason: HOSPADM

## 2017-01-01 RX ADMIN — HEPARIN SODIUM 5000 UNITS: 10000 INJECTION, SOLUTION INTRAVENOUS; SUBCUTANEOUS at 11:11

## 2017-01-01 RX ADMIN — CEFTRIAXONE SODIUM 1 G: 1 INJECTION, SOLUTION INTRAVENOUS at 17:23

## 2017-01-01 RX ADMIN — HEPARIN SODIUM 5000 UNITS: 10000 INJECTION, SOLUTION INTRAVENOUS; SUBCUTANEOUS at 09:14

## 2017-01-01 RX ADMIN — POTASSIUM CHLORIDE, DEXTROSE MONOHYDRATE AND SODIUM CHLORIDE: 150; 5; 450 INJECTION, SOLUTION INTRAVENOUS at 03:23

## 2017-01-01 RX ADMIN — HEPARIN SODIUM 5000 UNITS: 10000 INJECTION, SOLUTION INTRAVENOUS; SUBCUTANEOUS at 21:48

## 2017-01-01 RX ADMIN — IOPAMIDOL 100 ML: 612 INJECTION, SOLUTION INTRAVASCULAR at 19:43

## 2017-01-01 RX ADMIN — LIDOCAINE HYDROCHLORIDE 1 ML: 10 INJECTION, SOLUTION EPIDURAL; INFILTRATION; INTRACAUDAL; PERINEURAL at 15:28

## 2017-01-01 RX ADMIN — POTASSIUM CHLORIDE, DEXTROSE MONOHYDRATE AND SODIUM CHLORIDE: 150; 5; 450 INJECTION, SOLUTION INTRAVENOUS at 01:26

## 2017-01-01 RX ADMIN — HEPARIN SODIUM 5000 UNITS: 10000 INJECTION, SOLUTION INTRAVENOUS; SUBCUTANEOUS at 10:48

## 2017-01-01 RX ADMIN — IPRATROPIUM BROMIDE AND ALBUTEROL SULFATE 3 ML: .5; 3 SOLUTION RESPIRATORY (INHALATION) at 07:06

## 2017-01-01 RX ADMIN — IPRATROPIUM BROMIDE AND ALBUTEROL SULFATE 3 ML: .5; 3 SOLUTION RESPIRATORY (INHALATION) at 07:14

## 2017-01-01 RX ADMIN — POTASSIUM CHLORIDE, DEXTROSE MONOHYDRATE AND SODIUM CHLORIDE: 150; 5; 450 INJECTION, SOLUTION INTRAVENOUS at 13:03

## 2017-01-01 RX ADMIN — POTASSIUM CHLORIDE, DEXTROSE MONOHYDRATE AND SODIUM CHLORIDE: 150; 5; 450 INJECTION, SOLUTION INTRAVENOUS at 11:05

## 2017-01-01 RX ADMIN — AZITHROMYCIN MONOHYDRATE 500 MG: 500 INJECTION, POWDER, LYOPHILIZED, FOR SOLUTION INTRAVENOUS at 19:41

## 2017-01-01 RX ADMIN — HEPARIN SODIUM 5000 UNITS: 10000 INJECTION, SOLUTION INTRAVENOUS; SUBCUTANEOUS at 09:50

## 2017-01-01 RX ADMIN — IPRATROPIUM BROMIDE AND ALBUTEROL SULFATE 3 ML: .5; 3 SOLUTION RESPIRATORY (INHALATION) at 19:27

## 2017-01-01 RX ADMIN — HEPARIN SODIUM 5000 UNITS: 10000 INJECTION, SOLUTION INTRAVENOUS; SUBCUTANEOUS at 19:23

## 2017-01-01 RX ADMIN — IPRATROPIUM BROMIDE AND ALBUTEROL SULFATE 3 ML: .5; 3 SOLUTION RESPIRATORY (INHALATION) at 15:09

## 2017-01-01 RX ADMIN — IPRATROPIUM BROMIDE AND ALBUTEROL SULFATE 3 ML: .5; 3 SOLUTION RESPIRATORY (INHALATION) at 10:57

## 2017-01-01 RX ADMIN — IPRATROPIUM BROMIDE AND ALBUTEROL SULFATE 3 ML: .5; 3 SOLUTION RESPIRATORY (INHALATION) at 07:13

## 2017-01-01 RX ADMIN — HEPARIN SODIUM 5000 UNITS: 10000 INJECTION, SOLUTION INTRAVENOUS; SUBCUTANEOUS at 11:22

## 2017-01-01 RX ADMIN — AZITHROMYCIN MONOHYDRATE 500 MG: 500 INJECTION, POWDER, LYOPHILIZED, FOR SOLUTION INTRAVENOUS at 20:55

## 2017-01-01 RX ADMIN — HEPARIN SODIUM 5000 UNITS: 10000 INJECTION, SOLUTION INTRAVENOUS; SUBCUTANEOUS at 23:35

## 2017-01-01 RX ADMIN — IPRATROPIUM BROMIDE AND ALBUTEROL SULFATE 3 ML: .5; 3 SOLUTION RESPIRATORY (INHALATION) at 11:00

## 2017-01-01 RX ADMIN — POTASSIUM CHLORIDE, DEXTROSE MONOHYDRATE AND SODIUM CHLORIDE: 150; 5; 450 INJECTION, SOLUTION INTRAVENOUS at 07:04

## 2017-01-01 RX ADMIN — IPRATROPIUM BROMIDE AND ALBUTEROL SULFATE 3 ML: .5; 3 SOLUTION RESPIRATORY (INHALATION) at 19:32

## 2017-01-01 RX ADMIN — IPRATROPIUM BROMIDE AND ALBUTEROL SULFATE 3 ML: .5; 3 SOLUTION RESPIRATORY (INHALATION) at 13:07

## 2017-01-01 RX ADMIN — IPRATROPIUM BROMIDE AND ALBUTEROL SULFATE 3 ML: .5; 3 SOLUTION RESPIRATORY (INHALATION) at 14:44

## 2017-01-01 RX ADMIN — MULTIPLE VITAMINS W/ MINERALS TAB 1 TABLET: TAB at 10:35

## 2017-01-01 RX ADMIN — HEPARIN SODIUM 5000 UNITS: 10000 INJECTION, SOLUTION INTRAVENOUS; SUBCUTANEOUS at 21:40

## 2017-01-01 RX ADMIN — MULTIPLE VITAMINS W/ MINERALS TAB 1 TABLET: TAB at 09:14

## 2017-01-01 RX ADMIN — IPRATROPIUM BROMIDE AND ALBUTEROL SULFATE 3 ML: .5; 3 SOLUTION RESPIRATORY (INHALATION) at 07:12

## 2017-01-01 RX ADMIN — CEFTRIAXONE SODIUM 1 G: 1 INJECTION, SOLUTION INTRAVENOUS at 16:44

## 2017-01-01 RX ADMIN — IPRATROPIUM BROMIDE AND ALBUTEROL SULFATE 3 ML: .5; 3 SOLUTION RESPIRATORY (INHALATION) at 19:29

## 2017-01-01 RX ADMIN — IPRATROPIUM BROMIDE AND ALBUTEROL SULFATE 3 ML: .5; 3 SOLUTION RESPIRATORY (INHALATION) at 17:25

## 2017-01-01 RX ADMIN — IPRATROPIUM BROMIDE AND ALBUTEROL SULFATE 3 ML: .5; 3 SOLUTION RESPIRATORY (INHALATION) at 21:08

## 2017-01-01 RX ADMIN — HEPARIN SODIUM 5000 UNITS: 10000 INJECTION, SOLUTION INTRAVENOUS; SUBCUTANEOUS at 13:25

## 2017-01-01 RX ADMIN — SODIUM CHLORIDE 1000 ML: 9 INJECTION, SOLUTION INTRAVENOUS at 16:09

## 2017-01-01 RX ADMIN — IPRATROPIUM BROMIDE AND ALBUTEROL SULFATE 3 ML: .5; 3 SOLUTION RESPIRATORY (INHALATION) at 13:55

## 2017-01-01 RX ADMIN — POTASSIUM CHLORIDE, DEXTROSE MONOHYDRATE AND SODIUM CHLORIDE: 150; 5; 450 INJECTION, SOLUTION INTRAVENOUS at 13:43

## 2017-01-01 RX ADMIN — CEFTRIAXONE SODIUM 1 G: 1 INJECTION, SOLUTION INTRAVENOUS at 16:35

## 2017-01-01 RX ADMIN — IPRATROPIUM BROMIDE AND ALBUTEROL SULFATE 3 ML: .5; 3 SOLUTION RESPIRATORY (INHALATION) at 20:22

## 2017-01-01 RX ADMIN — IPRATROPIUM BROMIDE AND ALBUTEROL SULFATE 3 ML: .5; 3 SOLUTION RESPIRATORY (INHALATION) at 08:10

## 2017-01-01 RX ADMIN — AZITHROMYCIN MONOHYDRATE 500 MG: 500 INJECTION, POWDER, LYOPHILIZED, FOR SOLUTION INTRAVENOUS at 18:28

## 2017-01-01 RX ADMIN — MULTIPLE VITAMINS W/ MINERALS TAB 1 TABLET: TAB at 13:24

## 2017-01-01 RX ADMIN — POTASSIUM CHLORIDE, DEXTROSE MONOHYDRATE AND SODIUM CHLORIDE: 150; 5; 450 INJECTION, SOLUTION INTRAVENOUS at 18:23

## 2017-01-01 RX ADMIN — CEFTRIAXONE SODIUM 2 G: 2 INJECTION, SOLUTION INTRAVENOUS at 17:31

## 2017-01-01 RX ADMIN — IPRATROPIUM BROMIDE AND ALBUTEROL SULFATE 3 ML: .5; 3 SOLUTION RESPIRATORY (INHALATION) at 15:01

## 2017-01-01 RX ADMIN — POTASSIUM CHLORIDE, DEXTROSE MONOHYDRATE AND SODIUM CHLORIDE: 150; 5; 450 INJECTION, SOLUTION INTRAVENOUS at 16:10

## 2017-01-01 RX ADMIN — CEFTRIAXONE SODIUM 1 G: 1 INJECTION, SOLUTION INTRAVENOUS at 17:05

## 2017-01-01 RX ADMIN — HEPARIN SODIUM 5000 UNITS: 10000 INJECTION, SOLUTION INTRAVENOUS; SUBCUTANEOUS at 09:56

## 2017-01-01 RX ADMIN — IPRATROPIUM BROMIDE AND ALBUTEROL SULFATE 3 ML: .5; 3 SOLUTION RESPIRATORY (INHALATION) at 16:00

## 2017-01-01 RX ADMIN — IPRATROPIUM BROMIDE AND ALBUTEROL SULFATE 3 ML: .5; 3 SOLUTION RESPIRATORY (INHALATION) at 19:28

## 2017-01-01 RX ADMIN — AZITHROMYCIN MONOHYDRATE 500 MG: 500 INJECTION, POWDER, LYOPHILIZED, FOR SOLUTION INTRAVENOUS at 18:06

## 2017-01-01 RX ADMIN — IPRATROPIUM BROMIDE AND ALBUTEROL SULFATE 3 ML: .5; 3 SOLUTION RESPIRATORY (INHALATION) at 09:16

## 2017-01-01 RX ADMIN — AZITHROMYCIN MONOHYDRATE 500 MG: 500 INJECTION, POWDER, LYOPHILIZED, FOR SOLUTION INTRAVENOUS at 18:46

## 2017-01-01 RX ADMIN — IPRATROPIUM BROMIDE AND ALBUTEROL SULFATE 3 ML: .5; 3 SOLUTION RESPIRATORY (INHALATION) at 11:06

## 2017-01-01 RX ADMIN — POTASSIUM CHLORIDE, DEXTROSE MONOHYDRATE AND SODIUM CHLORIDE: 150; 5; 450 INJECTION, SOLUTION INTRAVENOUS at 01:07

## 2017-01-01 RX ADMIN — HEPARIN SODIUM 5000 UNITS: 10000 INJECTION, SOLUTION INTRAVENOUS; SUBCUTANEOUS at 20:56

## 2017-01-01 RX ADMIN — IPRATROPIUM BROMIDE AND ALBUTEROL SULFATE 3 ML: .5; 3 SOLUTION RESPIRATORY (INHALATION) at 11:09

## 2017-01-01 RX ADMIN — HEPARIN SODIUM 5000 UNITS: 10000 INJECTION, SOLUTION INTRAVENOUS; SUBCUTANEOUS at 09:55

## 2017-01-01 RX ADMIN — IPRATROPIUM BROMIDE AND ALBUTEROL SULFATE 3 ML: .5; 3 SOLUTION RESPIRATORY (INHALATION) at 15:03

## 2017-01-01 RX ADMIN — POTASSIUM CHLORIDE, DEXTROSE MONOHYDRATE AND SODIUM CHLORIDE: 150; 5; 450 INJECTION, SOLUTION INTRAVENOUS at 19:12

## 2017-01-01 RX ADMIN — IPRATROPIUM BROMIDE AND ALBUTEROL SULFATE 3 ML: .5; 3 SOLUTION RESPIRATORY (INHALATION) at 12:07

## 2017-01-01 RX ADMIN — POTASSIUM CHLORIDE, DEXTROSE MONOHYDRATE AND SODIUM CHLORIDE: 150; 5; 450 INJECTION, SOLUTION INTRAVENOUS at 04:49

## 2017-01-01 RX ADMIN — IPRATROPIUM BROMIDE AND ALBUTEROL SULFATE 3 ML: .5; 3 SOLUTION RESPIRATORY (INHALATION) at 15:17

## 2017-01-01 RX ADMIN — IPRATROPIUM BROMIDE AND ALBUTEROL SULFATE 3 ML: .5; 3 SOLUTION RESPIRATORY (INHALATION) at 16:38

## 2017-01-01 RX ADMIN — POTASSIUM CHLORIDE, DEXTROSE MONOHYDRATE AND SODIUM CHLORIDE: 150; 5; 450 INJECTION, SOLUTION INTRAVENOUS at 11:04

## 2017-01-01 RX ADMIN — IPRATROPIUM BROMIDE AND ALBUTEROL SULFATE 3 ML: .5; 3 SOLUTION RESPIRATORY (INHALATION) at 15:12

## 2017-01-01 RX ADMIN — AZITHROMYCIN MONOHYDRATE 500 MG: 500 INJECTION, POWDER, LYOPHILIZED, FOR SOLUTION INTRAVENOUS at 19:55

## 2017-01-01 RX ADMIN — SODIUM CHLORIDE 1000 ML: 9 INJECTION, SOLUTION INTRAVENOUS at 14:18

## 2017-01-01 RX ADMIN — IPRATROPIUM BROMIDE AND ALBUTEROL SULFATE 3 ML: .5; 3 SOLUTION RESPIRATORY (INHALATION) at 20:07

## 2017-01-01 RX ADMIN — IPRATROPIUM BROMIDE AND ALBUTEROL SULFATE 3 ML: .5; 3 SOLUTION RESPIRATORY (INHALATION) at 11:10

## 2017-01-01 RX ADMIN — IPRATROPIUM BROMIDE AND ALBUTEROL SULFATE 3 ML: .5; 3 SOLUTION RESPIRATORY (INHALATION) at 07:04

## 2017-01-01 RX ADMIN — HEPARIN SODIUM 5000 UNITS: 10000 INJECTION, SOLUTION INTRAVENOUS; SUBCUTANEOUS at 01:37

## 2017-01-01 RX ADMIN — HEPARIN SODIUM 5000 UNITS: 10000 INJECTION, SOLUTION INTRAVENOUS; SUBCUTANEOUS at 21:24

## 2017-01-01 RX ADMIN — CEFTRIAXONE SODIUM 1 G: 1 INJECTION, SOLUTION INTRAVENOUS at 16:37

## 2017-01-01 RX ADMIN — IPRATROPIUM BROMIDE AND ALBUTEROL SULFATE 3 ML: .5; 3 SOLUTION RESPIRATORY (INHALATION) at 19:18

## 2017-01-01 RX ADMIN — IOPAMIDOL 100 ML: 612 INJECTION, SOLUTION INTRAVASCULAR at 15:46

## 2017-01-01 RX ADMIN — IPRATROPIUM BROMIDE AND ALBUTEROL SULFATE 3 ML: .5; 3 SOLUTION RESPIRATORY (INHALATION) at 07:08

## 2017-01-01 RX ADMIN — MULTIPLE VITAMINS W/ MINERALS TAB 1 TABLET: TAB at 09:09

## 2017-01-01 RX ADMIN — POTASSIUM CHLORIDE, DEXTROSE MONOHYDRATE AND SODIUM CHLORIDE: 150; 5; 450 INJECTION, SOLUTION INTRAVENOUS at 23:43

## 2017-01-01 RX ADMIN — IPRATROPIUM BROMIDE AND ALBUTEROL SULFATE 3 ML: .5; 3 SOLUTION RESPIRATORY (INHALATION) at 19:35

## 2017-01-01 RX ADMIN — POTASSIUM CHLORIDE, DEXTROSE MONOHYDRATE AND SODIUM CHLORIDE: 150; 5; 450 INJECTION, SOLUTION INTRAVENOUS at 21:24

## 2017-01-01 RX ADMIN — IPRATROPIUM BROMIDE AND ALBUTEROL SULFATE 3 ML: .5; 3 SOLUTION RESPIRATORY (INHALATION) at 07:02

## 2017-01-01 RX ADMIN — IPRATROPIUM BROMIDE AND ALBUTEROL SULFATE 3 ML: .5; 3 SOLUTION RESPIRATORY (INHALATION) at 07:44

## 2017-01-01 RX ADMIN — CEFTRIAXONE SODIUM 1 G: 1 INJECTION, SOLUTION INTRAVENOUS at 17:41

## 2017-01-01 RX ADMIN — AZITHROMYCIN MONOHYDRATE 500 MG: 500 INJECTION, POWDER, LYOPHILIZED, FOR SOLUTION INTRAVENOUS at 18:23

## 2017-01-01 RX ADMIN — HEPARIN SODIUM 5000 UNITS: 10000 INJECTION, SOLUTION INTRAVENOUS; SUBCUTANEOUS at 12:35

## 2017-01-01 RX ADMIN — IPRATROPIUM BROMIDE AND ALBUTEROL SULFATE 3 ML: .5; 3 SOLUTION RESPIRATORY (INHALATION) at 11:45

## 2017-01-01 RX ADMIN — AZITHROMYCIN MONOHYDRATE 500 MG: 500 INJECTION, POWDER, LYOPHILIZED, FOR SOLUTION INTRAVENOUS at 19:35

## 2017-01-01 RX ADMIN — HEPARIN SODIUM 5000 UNITS: 10000 INJECTION, SOLUTION INTRAVENOUS; SUBCUTANEOUS at 00:08

## 2017-01-01 RX ADMIN — POTASSIUM CHLORIDE, DEXTROSE MONOHYDRATE AND SODIUM CHLORIDE: 150; 5; 450 INJECTION, SOLUTION INTRAVENOUS at 13:48

## 2017-01-01 ASSESSMENT — ACTIVITIES OF DAILY LIVING (ADL)
DRESS: 2-->ASSISTIVE PERSON
BATHING: 2-->ASSISTIVE PERSON
COGNITION: 0 - NO COGNITION ISSUES REPORTED
FALL_HISTORY_WITHIN_LAST_SIX_MONTHS: NO
AMBULATION: 2-->ASSISTIVE PERSON
RETIRED_EATING: 0-->INDEPENDENT
TOILETING: 2-->ASSISTIVE PERSON
RETIRED_COMMUNICATION: 0-->UNDERSTANDS/COMMUNICATES WITHOUT DIFFICULTY
TRANSFERRING: 2-->ASSISTIVE PERSON
SWALLOWING: 0-->SWALLOWS FOODS/LIQUIDS WITHOUT DIFFICULTY

## 2017-01-01 ASSESSMENT — ENCOUNTER SYMPTOMS
NAUSEA: 0
NECK PAIN: 0
ACTIVITY CHANGE: 1
FEVER: 0
BACK PAIN: 0
HEADACHES: 0
WEAKNESS: 1
WHEEZING: 0
CHEST TIGHTNESS: 0
ABDOMINAL PAIN: 0
COUGH: 1
DIARRHEA: 0
FATIGUE: 1
SHORTNESS OF BREATH: 1
CHILLS: 0
APPETITE CHANGE: 1
VOMITING: 0
UNEXPECTED WEIGHT CHANGE: 1

## 2017-03-13 PROBLEM — J43.9 EMPHYSEMA, UNSPECIFIED (H): Status: ACTIVE | Noted: 2017-01-01

## 2017-03-13 PROBLEM — E86.0 DEHYDRATION: Status: ACTIVE | Noted: 2017-01-01

## 2017-03-13 PROBLEM — E46 MALNUTRITION (H): Status: ACTIVE | Noted: 2017-01-01

## 2017-03-13 PROBLEM — J18.9 PNEUMONIA: Status: ACTIVE | Noted: 2017-01-01

## 2017-03-13 PROBLEM — J15.9 COMMUNITY ACQUIRED BACTERIAL PNEUMONIA: Status: ACTIVE | Noted: 2017-01-01

## 2017-03-13 NOTE — IP AVS SNAPSHOT
[facility]    [address] [address]    Phone:  [phone]    Fax:  [phone]                                       After Visit Summary   3/13/2017    Scott Liang    MRN: [id number]           After Visit Summary Signature Page     I have received my discharge instructions, and my questions have been answered. I have discussed any challenges I see with this plan with the nurse or doctor.    ..........................................................................................................................................  Patient/Patient Representative Signature      ..........................................................................................................................................  Patient Representative Print Name and Relationship to Patient    ..................................................               ................................................  Date                                            Time    ..........................................................................................................................................  Reviewed by Signature/Title    ...................................................              ..............................................  Date                                                            Time

## 2017-03-13 NOTE — PROGRESS NOTES
Preliminary results for STAT imaging were received at 1608 (time on fax or preliminary report).    Preliminary results for STAT imaging were given to [facility] at 1608 (time) and scanned into PACs at 1608 (time).

## 2017-03-13 NOTE — ED NOTES
Presents to the ED with brother who he lives with.  States he has been weak for 3 days and brother reports he has been getting weaker for 2 weeks.  Pt reports eating less.  Cachetic overall.  Voice is weak.  Does not have a PCP. Does not seek medical attention ever.  Brother reports he has no medical hx.  Assessment is complete. Call light is given.  Brother at the bedside.

## 2017-03-13 NOTE — PLAN OF CARE
Problem: Patient Goal: Social Work Focus  Goal: 1. Patient Goal: Social Work Focus  Met with Scott and his brother, Los for CM/SS assessment.  See flow sheet for completed assessment.     Scott lives at home with his brother, Los.  Scott is independent with dressing and bathing.  He manages his own medications.  Los does all of the driving, household chores and shopping.  Scott denies hobbies and klaudia importance.  He is a .  He denies tobacco, alcohol and drug use.  He does not have a preference on pharmacy. He is not interested in connecting with a doctor, dentist or eye care provider.  He is not connected with services or a .       Los states that Scott has been becoming weaker the last few days.  Mentioned the possibility of home care with therapy but Scott declined.  Scott and Los denied questions or concerns at this time.

## 2017-03-13 NOTE — ED PROVIDER NOTES
History     Chief Complaint   Patient presents with     Generalized Weakness     weakness and no appetite, brother states has been in bed for about a month or so     The history is provided by the patient.     Racheal Liang is a 81 year old male who presented to the ED along with brother for evaluation of weakness, weight loss, and mild cough.  He lives with his brother who states that racheal has not been out of bed for days and has been progressively weaker over the last few months.  No fevers.  Poor appetite.  No rashes.  No recent travel.  He has no PMH.  He has no PCP.  No medications.          I have reviewed the Medications, Allergies, Past Medical and Surgical History, and Social History in the Epic system.    Review of Systems   Constitutional: Positive for activity change, appetite change, fatigue and unexpected weight change. Negative for chills and fever.   HENT: Negative.    Eyes: Negative for visual disturbance.   Respiratory: Positive for cough and shortness of breath. Negative for chest tightness and wheezing.    Cardiovascular: Negative for chest pain.   Gastrointestinal: Negative for abdominal pain, diarrhea, nausea and vomiting.   Genitourinary: Negative.    Musculoskeletal: Negative for back pain and neck pain.   Skin: Negative.    Neurological: Positive for weakness. Negative for headaches.       Physical Exam   BP: 100/64  Heart Rate: 110  Temp: 97.1  F (36.2  C)  Resp: 18  SpO2: 92 %  Physical Exam   Constitutional: He is oriented to person, place, and time. He appears well-developed and well-nourished. No distress.   Appears cachectic and dry      HENT:   Dry mucus membranes and poor dentition    Neck: Normal range of motion. Neck supple.   Cardiovascular: Regular rhythm.    Slight tachycardia    Pulmonary/Chest: Effort normal.   Generalized mild rhonchi and diminished    Abdominal: Soft. There is no tenderness.   Neurological: He is alert and oriented to person, place, and time.    Skin: Skin is warm and dry. No rash noted.   Poor turgor    Psychiatric: He has a normal mood and affect.   Nursing note and vitals reviewed.      ED Course     ED Course     Procedures        Medications   0.9% sodium chloride BOLUS (0 mLs Intravenous Stopped 3/13/17 1530)     Followed by   0.9% sodium chloride infusion (1,000 mLs Intravenous New Bag 3/13/17 1609)   cefTRIAXone IN D5W (ROCEPHIN) intermittent infusion 2 g (not administered)   azithromycin (ZITHROMAX) 500 mg in NaCl 0.9 % 250 mL intermittent infusion (not administered)   iopamidol (ISOVUE-300) IV solution 61% 100 mL (100 mLs Intravenous Given 3/13/17 1546)   sodium chloride (PF) 0.9% PF flush 60 mL (60 mLs Intravenous Given 3/13/17 1547)     Results for orders placed or performed during the hospital encounter of 03/13/17 (from the past 24 hour(s))   Basic metabolic panel   Result Value Ref Range    Sodium 142 133 - 144 mmol/L    Potassium 5.1 3.4 - 5.3 mmol/L    Chloride 100 94 - 109 mmol/L    Carbon Dioxide 34 (H) 20 - 32 mmol/L    Anion Gap 8 3 - 14 mmol/L    Glucose 109 (H) 70 - 99 mg/dL    Urea Nitrogen 34 (H) 7 - 30 mg/dL    Creatinine 0.58 (L) 0.66 - 1.25 mg/dL    GFR Estimate >90  Non  GFR Calc   >60 mL/min/1.7m2    GFR Estimate If Black >90   GFR Calc   >60 mL/min/1.7m2    Calcium 8.5 8.5 - 10.1 mg/dL   CBC with platelets differential   Result Value Ref Range    WBC 11.7 (H) 4.0 - 11.0 10e9/L    RBC Count 3.87 (L) 4.4 - 5.9 10e12/L    Hemoglobin 10.0 (L) 13.3 - 17.7 g/dL    Hematocrit 34.5 (L) 40.0 - 53.0 %    MCV 89 78 - 100 fl    MCH 25.8 (L) 26.5 - 33.0 pg    MCHC 29.0 (L) 31.5 - 36.5 g/dL    RDW 15.9 (H) 10.0 - 15.0 %    Platelet Count 324 150 - 450 10e9/L    Diff Method Automated Method     % Neutrophils 86.7 %    % Lymphocytes 5.7 %    % Monocytes 7.0 %    % Eosinophils 0.0 %    % Basophils 0.2 %    % Immature Granulocytes 0.4 %    Nucleated RBCs 0 0 /100    Absolute Neutrophil 10.1 (H) 1.6 - 8.3 10e9/L     Absolute Lymphocytes 0.7 (L) 0.8 - 5.3 10e9/L    Absolute Monocytes 0.8 0.0 - 1.3 10e9/L    Absolute Eosinophils 0.0 0.0 - 0.7 10e9/L    Absolute Basophils 0.0 0.0 - 0.2 10e9/L    Abs Immature Granulocytes 0.1 0 - 0.4 10e9/L    Absolute Nucleated RBC 0.0    XR Chest Port 1 View    Narrative    CHEST SINGLE VIEW    FINDINGS:  There are confluent opacities in both lungs in the upper  portion.  Bilateral apical pleural thickening is noted.  Interstitial  thickening is also seen, worse on the left than the right.  Upward  retraction of sarah are seen bilaterally.    IMPRESSION:  CONFLUENT OPACITIES IN BOTH LUNGS, PARTICULARLY ON THE  LEFT.  THERE IS A NODULAR APPEARANCE WHICH WOULD BE SUSPICIOUS FOR  MALIGNANCY.  I WOULD RECOMMEND A CHEST CT BE OBTAINED.  Exam Date: Mar 13, 2017 02:29:30 PM  Author: SUNNI KISER  This report is final and signed     CT Chest w Contrast    Narrative    CHEST CT    COMPARISON:  Today's study is compared to a prior chest x-ray from  earlier the same day.    FINDINGS:  Severe emphysematous changes are seen throughout both  lungs.  There are patchy areas of alveolar consolidation seen  throughout the lingula and left lower lobe.  Calcified plaque  formation is seen about the posterior aspects of the right  hemithorax.    There is consolidation along the lateral segment of the left upper  lobe, associated with bronchiectasis.  Additionally, there is  peripheral consolidation and marked bronchiectasis seen throughout the  right upper lobe.  Lymph nodes of the mediastinum are mildly  prominent.  There is no evidence of an acute fracture.  Visualized  portions of the upper abdomen are unremarkable.    IMPRESSION:  1.  SEVERE EMPHYSEMA WITH MULTIFOCAL PNEUMONIA, LIKELY CHRONIC WITHIN  THE UPPER LOBES.    2.  SOMEWHAT NODULAR ALVEOLAR CONSOLIDATION SEEN WITHIN THE LOWER  LOBES.  THE POSSIBILITY OF NEOPLASM IS NOT COMPLETELY EXCLUDED.                            SIGNATURE PAGE ONLY  Exam Date:  Mar 13, 2017 03:42:00 PM  Author: LILIANA PISANO  This report is preliminary and transcribed          Critical Care time:  none               Labs Ordered and Resulted from Time of ED Arrival Up to the Time of Departure from the ED   BASIC METABOLIC PANEL - Abnormal; Notable for the following:        Result Value    Carbon Dioxide 34 (*)     Glucose 109 (*)     Urea Nitrogen 34 (*)     Creatinine 0.58 (*)     All other components within normal limits   CBC WITH PLATELETS DIFFERENTIAL - Abnormal; Notable for the following:     WBC 11.7 (*)     RBC Count 3.87 (*)     Hemoglobin 10.0 (*)     Hematocrit 34.5 (*)     MCH 25.8 (*)     MCHC 29.0 (*)     RDW 15.9 (*)     Absolute Neutrophil 10.1 (*)     Absolute Lymphocytes 0.7 (*)     All other components within normal limits   URINE MACROSCOPIC WITH REFLEX TO MICRO   BLOOD CULTURE   BLOOD CULTURE       Assessments & Plan (with Medical Decision Making)   Findings as above.  Progressively worsening.  This may all be chronic but certainly needs admission. CAP started in the ED.  Case discussed with Dr. Woodard, who graciously accepted his care.     I have reviewed the nursing notes.    I have reviewed the findings, diagnosis, plan and need for follow up with the patient.    New Prescriptions    No medications on file       Final diagnoses:   Community acquired bacterial pneumonia   Emphysema, unspecified (H)   Malnutrition (H)       3/13/2017   [facility]     Nena Dalton PA-C  03/13/17 [id number]

## 2017-03-13 NOTE — IP AVS SNAPSHOT
"     Scott Liang #[id number] (CSN: [id number])  (81 year old M)  (Adm: 17)     [id number]               [facility]: [phone]            Patient Demographics     Patient Name Sex          Age SSN Address Phone    Scott Liang Male 1935 (81 year old) [id number] [address] [place] [address] [phone] (Home)      Emergency Contact(s)     Name Relation Home Work Mobile    Los Liang [phone]      No Secondary Contact Other none        Admission Information     Attending Provider Admitting Provider Admission Type Admission Date/Time    Everardo Weller MD Stenstrom, Scott, MD Emergency 17  1323    Discharge Date Hospital Service Auth/Cert Status Service Area     Internal Medicine Incomplete [facility]    Unit Room/Bed Admission Status       [facility] - Admission (Confirmed)       Admission     Complaint    Pneumonia      Hospital Account     Name Acct ID Class Status Primary Coverage    Scott Liang [id number] Inpatient Open MEDICARE - MEDICARE PT A ONLY            Guarantor Account (for Hospital Account #[id number])     Name Relation to Pt Service Area Active? Acct Type    Scott Liang Self [facility] Yes Personal/Family    Address Phone          [address] [place] [address] [phone](H)              Coverage Information (for Hospital Account #[id number])     F/O Payor/Plan Precert #    MEDICARE/MEDICARE PT A ONLY     Subscriber Subscriber #    Scott Liang [id number]    Address Phone    [employer]  [address]  [place] [address] [phone]                                                INTERAGENCY TRANSFER FORM - PHYSICIAN ORDERS   3/13/2017                       [facility]: [phone]            Attending Provider: Everardo Weller MD     Allergies:  No Known Allergies    Infection:  None   Service:  INTERNAL MED    Ht:  1.778 m (5' 10\")   Wt: "  39.2 kg (86 lb 6.7 oz)   Admission Wt:  39.7 kg (87 lb 8.4 oz)    BMI:  12.4 kg/m 2   BSA:  1.39 m 2            ED Clinical Impression     Diagnosis Description Comment Added By Time Added    Community acquired bacterial pneumonia [J15.9] Community acquired bacterial pneumonia [J15.9]  Nena Dalton PA-C 3/13/2017  4:37 PM    Emphysema, unspecified (H) [J43.9] Emphysema, unspecified (H) [J43.9]  Nena Dalton PA-C 3/13/2017  4:37 PM    Malnutrition (H) [E46] Malnutrition (H) [E46]  Nena Dalton PA-C 3/13/2017  4:38 PM      Hospital Problems as of 3/24/2017              Priority Class Noted POA    Community acquired bacterial pneumonia Medium  3/13/2017 Unknown    Emphysema, unspecified (H) Medium  3/13/2017 Unknown    Malnutrition (H) Medium  3/13/2017 Unknown    Dehydration Medium  3/13/2017 Unknown    Pneumonia Medium  3/13/2017 Yes      Non-Hospital Problems as of 3/24/2017     None      Code Status History     Date Active Date Inactive Code Status Order ID Comments User Context    3/24/2017  2:15 PM  Full Code [id number]  Everardo Weller MD Outpatient    3/13/2017  6:36 PM 3/24/2017  2:15 PM Full Code [id number]  Brayan Carroll MD Inpatient      Current Code Status     Date Active Code Status Order ID Comments User Context       Prior         Medication Review      Notice     You have not been prescribed any medications.            Your next 10 appointments already scheduled     Mar 29, 2017 10:30 AM CDT   (Arrive by 10:15 AM)   Office Visit with Lisa Reed MD   [facility] [place] ([facility])    [address]  [place] [address]   [phone]           Bring a current list of meds and any records pertaining to this visit.  For Physicals, please bring immunization records and any forms needing to be filled out.    Please arrive 15 minutes early to complete paperwork and register.              Statement of Approval     Ordered          03/24/17 1416  I have reviewed and agree  "with all the recommendations and orders detailed in this document.  EFFECTIVE NOW     Approved and electronically signed by:  Everardo Weller MD                                                 INTERAGENCY TRANSFER FORM - NURSING   3/13/2017                       [facility]: [phone]            Attending Provider: Everardo Weller MD     Allergies:  No Known Allergies    Infection:  None   Service:  INTERNAL MED    Ht:  1.778 m (5' 10\")   Wt:  39.2 kg (86 lb 6.7 oz)   Admission Wt:  39.7 kg (87 lb 8.4 oz)    BMI:  12.4 kg/m 2   BSA:  1.39 m 2            Advance Directives        Does patient have a scanned Advance Directive/ACP document in EPIC?           No        Immunizations     Name Date      Influenza (High Dose) 3 valent vaccine defer-03/14/17     Deferral:       Pneumococcal 23 valent defer-03/14/17     Deferral:         ASSESSMENT     Discharge Profile Flowsheet     DISCHARGE NEEDS ASSESSMENT     All Quadrants Bowel Sounds  audible and active in all quadrants 03/24/17 1012    Concerns To Be Addressed  discharge planning concerns 03/13/17 1605   Last Bowel Movement  03/22/17 03/22/17 0922    Patient/family verbalizes understanding of discharge plan recommendations?  Yes 03/13/17 1605   Passing flatus  no 03/23/17 1612    Medical Team notified of plan?  yes 03/13/17 1605   COMMUNICATION ASSESSMENT      Readmission Within The Last 30 Days  no previous admission in last 30 days 03/13/17 1836   Patient's communication style  spoken language (English or Bilingual) 03/13/17 1136    Anticipated Changes Related to Illness  none 03/13/17 1605   SKIN      Equipment Currently Used at Home  cane, straight 03/13/17 1606   Inspection  Full 03/24/17 1012    Transportation Available  family or friend will provide 03/13/17 1605   Skin areas NOT inspected  Sacrum;Coccyx;Buttock, right;Buttock, left;Hip, right;Hip, left 03/23/17 1612    Interventions provided  declined 03/13/17 1605   Skin WDL  ex 03/24/17 1012    " "# of Referrals Placed by CTS  -- (declined) 03/13/17 1605   Skin Color/Characteristics  bruised (ecchymotic);redness blanchable 03/24/17 1012    Key Recommendations  establish care- declined; home care- declined 03/13/17 1605   Skin Temperature  warm 03/24/17 1012    Does Patient Need a Referral for Clinic CC  No 03/13/17 1605   Skin Moisture  dry 03/24/17 1012    ASSESSMENT OF FUNCTIONAL STATUS     Skin Elasticity  quick return to original state 03/24/17 0210    Prior to admission patient needed assistance with:  Meal preparation;Laundry/Housekeeping;Shopping;Transportation;Handling finances;Home maintenance/Yard work 03/13/17 1605   Skin Integrity  bruise(s) 03/24/17 1012    Assesssment of Functional Status  Needs assistance with shopping;Needs assistance with transportation;Needs assistance with laundry/houskeeping;Needs assistance with handling finances 03/13/17 1605   Additional Documentation  -- (foam patches down back. heel boots) 03/24/17 1012    GASTROINTESTINAL (ADULT,PEDIATRIC,OB)     SAFETY      GI WDL  ex 03/24/17 1012   Safety WDL  WDL 03/24/17 1012    Abdominal Appearance  concave 03/24/17 1012   Safety Equipment  oxygen flowmeter 03/24/17 1012                 Assessment WDL (Within Defined Limits) Definitions           Safety WDL     Effective: 09/28/15    Row Information: <b>WDL Definition:</b> Bed in low position, wheels locked; call light in reach; upper side rails up x 2; ID band on<br> <font color=\"gray\"><i>Item=AS safety wdl>>List=AS safety wdl>>Version=F14</i></font>      Skin WDL     Effective: 09/28/15    Row Information: <b>WDL Definition:</b> Warm; dry; intact; elastic; without discoloration; pressure points without redness<br> <font color=\"gray\"><i>Item=AS skin wdl>>List=AS skin wdl>>Version=F14</i></font>      Vitals     Vital Signs Flowsheet     VITAL SIGNS     Response to Interventions  Absence of nonverbal indicators of pain 03/24/17 0231    Temp  99.2  F (37.3  C) 03/24/17 1013   " "HEIGHT AND WEIGHT      Temp src  Tympanic 03/24/17 1013   Height  1.778 m (5' 10\") 03/13/17 1805    Resp  18 03/24/17 1013   Weight  39.2 kg (86 lb 6.7 oz) (standing scALE) 03/24/17 0535    Pulse  80 03/19/17 2356   BSA (Calculated - sq m)  1.4 03/13/17 1805    Heart Rate  87 03/24/17 1013   BMI (Calculated)  12.58 03/13/17 1805    Pulse/Heart Rate Source  Monitor 03/24/17 1013   EKG MONITORING      BP  99/51 03/24/17 1013   Cardiac Regularity  Regular 03/17/17 1002    BP Location  Left arm 03/24/17 0359   Cardiac Rhythm  NSR 03/17/17 1002    OXYGEN THERAPY     POSITIONING      SpO2  (!)  90 % 03/24/17 1117   Body Position  right, side-lying 03/24/17 1217    O2 Device  Nasal cannula 03/24/17 1117   Head of Bed (HOB)  HOB at 20-30 degrees 03/24/17 1012    Oxygen Delivery  1/2 LPM 03/24/17 1117   Positioning/Transfer Devices  pillows;in use 03/24/17 1012    PAIN/COMFORT     Chair  Upright in chair 03/23/17 1246    Patient Currently in Pain  denies 03/24/17 1013   DAILY CARE      Preferred Pain Scale  word (verbal rating pain scale) 03/24/17 0204   Activity Type  activity encouraged 03/24/17 1012    Patient's Stated Pain Goal  Unable to Assess 03/24/17 0204   Activity Level of Assistance  assistance, 2 people 03/24/17 1012    0-10 Pain Scale  0 03/18/17 2305   Additional Documentation  Ambulation Distance (Row) 03/15/17 1955    Word Pain Scale  2 03/24/17 0204   Activity Assistive Device  gait belt;walker 03/24/17 1012    Pain Location  Generalized 03/24/17 0204   ECG      Pain Orientation  Right;Left;Anterior;Posterior 03/24/17 0204   ECG Rhythm  Sinus rhythm 03/17/17 1956    Pain Descriptors  Discomfort 03/24/17 0204   Lead Monitored  Lead II 03/17/17 1614    Pain Intervention(s)  Repositioned;Declines 03/24/17 0204                 Patient Lines/Drains/Airways Status    Active LINES/DRAINS/AIRWAYS     Name: Placement date: Placement time: Site: Days: Last dressing change:    Urethral Catheter 16 fr 03/24/17   0709    "   less than 1     Peripheral IV 03/23/17 Right Upper forearm 03/23/17   2010   Upper forearm   less than 1     Wound 03/15/17 Thoracic spine Shear injury superfial sheering 03/15/17   0809   Thoracic spine   9             Patient Lines/Drains/Airways Status    Active PICC/CVC     None            Intake/Output Detail Report     Date Intake     Output Net    Shift P.O. I.V. IV Piggyback Total Urine Total       Noc 03/22/17 2300 - 03/23/17 0659 -- 1280 -- 1280 -- -- 1280    Day 03/23/17 0700 - 03/23/17 1459 -- -- -- -- -- -- 0    Iveth 03/23/17 1500 - 03/23/17 2259 -- -- -- -- -- -- 0    Noc 03/23/17 2300 - 03/24/17 0659 -- 1195 -- 1195 800 800 395    Day 03/24/17 0700 - 03/24/17 1459 -- -- -- -- 500 500 -500      Last Void/BM       Most Recent Value    Urine Occurrence 1 at 03/23/2017 2242    Stool Occurrence 1 at 03/23/2017 0616      Case Management/Discharge Planning     Case Management/Discharge Planning Flowsheet     REFERRAL INFORMATION     EMPLOYMENT      Did the Initial Social Work Assessment result in a Social Work Case?  Yes 03/13/17 1559   Do you work full or part-time?  no 03/13/17 1605    Admission Type  other (see comments) (emergency) 03/13/17 1559   Will you be able to return to your job?  no 03/13/17 1605    Arrived From  home or self-care 03/13/17 1559   COPING/STRESS      Referral Source  emergency department 03/13/17 1559   Major Change/Loss/Stressor  none 03/13/17 1839    # of Referrals Placed by CTS  -- (declined) 03/13/17 1605   ASSESSMENT/CONCERNS TO BE ADDRESSED      Reason For Consult  discharge planning 03/13/17 1559   Concerns To Be Addressed  discharge planning concerns 03/13/17 1605    Record Reviewed  patient profile;plan of care 03/13/17 1559   DISCHARGE PLANNING       Assigned to Case  Ana Rosa Contreras 03/13/17 1559   Patient/family verbalizes understanding of discharge plan recommendations?  Yes 03/13/17 1605    Primary Care Clinic Name  none 03/13/17 1559   Medical Team  notified of plan?  yes 03/13/17 1605    LIVING ENVIRONMENT     Readmission Within The Last 30 Days  no previous admission in last 30 days 03/13/17 1836    Lives With  sibling(s) 03/13/17 1836   Anticipated Changes Related to Illness  none 03/13/17 1605    Living Arrangements  house 03/13/17 1836   Transportation Available  family or friend will provide 03/13/17 1605    Provides Primary Care For  no one 03/13/17 1605   Interventions provided  declined 03/13/17 1605    Primary Care Provided By  other (see comments) (Los) 03/13/17 1605   Key Recommendations  establish care- declined; home care- declined 03/13/17 1605    Quality Of Family Relationships  involved;supportive 03/13/17 1605   Does Patient Need a Referral for Clinic CC  No 03/13/17 1605    Able to Return to Prior Living Arrangements  yes 03/13/17 1605   FINAL NOTE      HOME SAFETY     Final Note  see care plan 03/13/17 1605    Patient Feels Safe Living in Home?  yes 03/13/17 1605   FINAL RESOURCES      ASSESSMENT OF FAMILY/SOCIAL SUPPORT     Equipment Currently Used at Home  cane, straight 03/13/17 1606    Marital Status  Single 03/13/17 1605   ABUSE RISK SCREEN      Who is your support system?  Sibling(s) 03/13/17 1605   QUESTION TO PATIENT:  Has a member of your family or a partner(now or in the past) intimidated, hurt, manipulated, or controlled you in any way?  no 03/13/17 1834    Description of Support System  Involved;Supportive 03/13/17 1605   QUESTION TO PATIENT: Do you feel safe going back to the place where you are living?  yes 03/13/17 1834    Support Assessment  Adequate family and caregiver support 03/13/17 1605   OBSERVATION: Is there reason to believe there has been maltreatment of a vulnerable adult (ie. Physical/Sexual/Emotional abuse, self neglect, lack of adequate food, shelter, medical care, or financial exploitation)?  no 03/13/17 1834    Quality of Family Relationships  involved;supportive 03/13/17 1605   (R) MENTAL HEALTH SUICIDE RISK       Communication preferences  phone 03/13/17 1605   Are you depressed or being treated for depression?  No 03/13/17 1835    ASSESSMENT OF FUNCTIONAL STATUS     HOMICIDE RISK      Prior to admission patient needed assistance with:  Meal preparation;Laundry/Housekeeping;Shopping;Transportation;Handling finances;Home maintenance/Yard work 03/13/17 1605   Homicidal Ideation  no 03/13/17 1136    Assesssment of Functional Status  Needs assistance with shopping;Needs assistance with transportation;Needs assistance with laundry/houskeeping;Needs assistance with handling finances 03/13/17 1605                       [facility]: [phone]            Medication Administration Report for Scott Liang as of 03/24/17 1421   Legend:    Given Hold Not Given Due Canceled Entry Other Actions    Time Time (Time) Time  Time-Action       Inactive    Active    Linked        Medications 03/18/17 03/19/17 03/20/17 03/21/17 03/22/17 03/23/17 03/24/17    acetaminophen (TYLENOL) tablet 650 mg  Dose: 650 mg Freq: EVERY 4 HOURS PRN Route: PO  PRN Reason: mild pain  Start: 03/13/17 1830   Admin Instructions: Alternate ibuprofen (if ordered) with acetaminophen.  Maximum acetaminophen dose from all sources = 75 mg/kg/day not to exceed 4 grams/day.               dextrose 5% and 0.45% NaCl + KCl 20 mEq/L infusion  Rate: 75 mL/hr Freq: CONTINUOUS Route: IV  Start: 03/13/17 1830    1823 ( )-New Bag        2343 ( )-New Bag        1303 ( )-New Bag        1610 ( )-New Bag         0704 ( )-New Bag        0126 ( )-New Bag           heparin sodium PF injection 5,000 Units  Dose: 5,000 Units Freq: EVERY 12 HOURS Route: SC  Start: 03/13/17 1845   Admin Instructions: HOLD heparin IF platelet count falls below 50% baseline or less than 100,000 /  L and notify provider. Use this product If CrCl less than 30 mL/min.     0950 (5,000 Units)-Given       2140 (5,000 Units)-Given        0955 (5,000 Units)-Given       2056 (5,000 Units)-Given         "1048 (5,000 Units)-Given               0008 (5,000 Units)-Given       1111 (5,000 Units)-Given       2335 (5,000 Units)-Given        1122 (5,000 Units)-Given               1235 (5,000 Units)-Given        0137 (5,000 Units)-Given       [ ] 1100       [ ] 2300           influenza Vac Split High-Dose (FLUZONE) injection 0.5 mL  Dose: 0.5 mL Freq: PRIOR TO DISCHARGE Route: IM  Start: 03/14/17 1000   Admin Instructions: Administer when afebrile (less than 100.4F) x 24 hours, or Prior to Discharge.  If not administering when scheduled , change the due time by following the instructions in the reference link below.  If patient refuses vaccine, chart as Vaccine Refused.               ipratropium - albuterol 0.5 mg/2.5 mg/3 mL (DUONEB) neb solution 3 mL  Dose: 3 mL Freq: EVERY 4 HOURS PRN Route: NEBULIZATION  PRN Reason: wheezing  Start: 03/23/17 1115         (2851)-Not Given        (0723)-Not Given       (1115)-Not Given           lidocaine (LMX4) kit  Freq: EVERY 1 HOUR PRN Route: Top  PRN Reason: pain  PRN Comment: with VAD insertion or accessing implanted port.  Start: 03/17/17 1503   Admin Instructions: Do NOT give if patient has a history of allergy to any local anesthetic or any \"nalini\" product.   Apply 30 minutes prior to VAD insertion or port access. MAX Dose: 2.5 g (  of 5 g tube)               lidocaine 1 % 1 mL  Dose: 1 mL Freq: EVERY 1 HOUR PRN Route: OTHER  PRN Comment: mild pain with VAD insertion or accessing implanted port  Start: 03/17/17 1503   Admin Instructions: Do NOT give if patient has a history of allergy to any local anesthetic or any \"nalini\" product. MAX dose 1 mL subcutaneous OR intradermal in divided doses.               multivitamin, therapeutic with minerals (THERA-VIT-M) tablet 1 tablet  Dose: 1 tablet Freq: DAILY Route: PO  Start: 03/15/17 0900    1018-Hold        (0916)-Not Given        (0946)-Not Given        (0807)-Not Given        (1101)-Not Given [C]        0909 (1 tablet)-Given        " (8512)-Not Given           naloxone (NARCAN) injection 0.1-0.4 mg  Dose: 0.1-0.4 mg Freq: EVERY 2 MIN PRN Route: IV  PRN Reason: opioid reversal  Start: 03/13/17 1824   Admin Instructions: For respiratory rate LESS than or EQUAL to 8.  Partial reversal dose:  0.1 mg titrated q 2 minutes for Analgesia Side Effects Monitoring Sedation Level of 3 (frequently drowsy, arousable, drifts to sleep during conversation).Full reversal dose:  0.4 mg bolus for Analgesia Side Effects Monitoring Sedation Level of 4 (somnolent, minimal or no response to stimulation).               ondansetron (ZOFRAN-ODT) ODT tab 4 mg  Dose: 4 mg Freq: EVERY 6 HOURS PRN Route: PO  PRN Reason: nausea  Start: 03/13/17 1830   Admin Instructions: This is Step 1 of nausea and vomiting management.  If nausea not resolved in 15 minutes, go to Step 2 prochlorperazine (COMPAZINE). Do not push through foil backing. Peel back foil and gently remove. Place on tongue immediately. Administration with liquid unnecessary              Or  ondansetron (ZOFRAN) injection 4 mg  Dose: 4 mg Freq: EVERY 6 HOURS PRN Route: IV  PRN Reasons: nausea,vomiting  Start: 03/13/17 1830   Admin Instructions: This is Step 1 of nausea and vomiting management.  If nausea not resolved in 15 minutes, go to Step 2 prochlorperazine (COMPAZINE).  Irritant.               pneumococcal vaccine (PNEUMOVAX 23-topher) injection 0.5 mL  Dose: 0.5 mL Freq: PRIOR TO DISCHARGE Route: IM  Start: 03/14/17 1000   Admin Instructions: Administer when afebrile (less than 100.4 ) x 24 hr OR prior to discharge. *Give Fact Sheet to Patient*. If patient is febrile, reassess in 8 hrs or every shift. Minor illnesses with or without fever does NOT contraindicate the vaccination. If not administering when scheduled , change the due time by following the instructions in the reference link below. If patient refuses vaccine, chart as Vaccine Refused.               sodium chloride (PF) 0.9% PF flush 3 mL  Dose: 3 mL  Freq: EVERY 1 HOUR PRN Route: IK  PRN Reason: line flush  Start: 03/17/17 1503   Admin Instructions: for peripheral IV flush post IV meds      1941 (3 mL)-Given                sodium chloride (PF) 0.9% PF flush 3 mL  Dose: 3 mL Freq: EVERY 8 HOURS Route: IK  Start: 03/13/17 1845   Admin Instructions: And Q1H PRN, to lock peripheral IV dormant line.     (0149)-Not Given       (1253)-Not Given        (0952)-Not Given       (1837)-Not Given                      (0555)-Not Given       (0946)-Not Given       (1803)-Not Given        (0437)-Not Given       (0955)-Not Given       (1746)-Not Given        (1102)-Not Given              (2115)-Not Given               (1245)-Not Given       (2007)-Not Given        (0211)-Not Given       [ ] 1045       [ ] 1845          Completed Medications  Medications 03/18/17 03/19/17 03/20/17 03/21/17 03/22/17 03/23/17 03/24/17         Dose: 100 mL Freq: ONCE Route: IV  Start: 03/23/17 1930   End: 03/23/17 1943 1943 (100 mL)-Given              Dose: 60 mL Freq: ONCE Route: IV  Start: 03/23/17 1930   End: 03/23/17 1943         1943 (60 mL)-Given           Discontinued Medications  Medications 03/18/17 03/19/17 03/20/17 03/21/17 03/22/17 03/23/17 03/24/17         Dose: 500 mg Freq: EVERY 24 HOURS Route: IV  Indications of Use: COMMUNITY ACQUIRED PNEUMONIA  Last Dose: 500 mg (03/20/17 2055)  Start: 03/14/17 1800   End: 03/21/17 1621 1955 (500 mg)-New Bag        1941 (500 mg)-New Bag        2055 (500 mg)-New Bag        1621-Med Discontinued            Dose: 1 g Freq: EVERY 24 HOURS Route: IV  Indications of Use: COMMUNITY ACQUIRED PNEUMONIA  Last Dose: 1 g (03/20/17 1637)  Start: 03/14/17 1730   End: 03/21/17 1621    1635 (1 g)-New Bag        1635 (1 g)-New Bag        1637 (1 g)-New Bag        1621-Med Discontinued            Dose: 3 mL Freq: 4 TIMES DAILY Route: NEBULIZATION  Start: 03/13/17 2000   End: 03/23/17 1107    0708 (3 mL)-Given       1106 (3 mL)-Given       1517 (3  mL)-Given       2022 (3 mL)-Given        0704 (3 mL)-Given       1100 (3 mL)-Given       1501 (3 mL)-Given       1932 (3 mL)-Given        0744 (3 mL)-Given       1355 (3 mL)-Given [C]       1725 (3 mL)-Given       2108 (3 mL)-Given        0712 (3 mL)-Given       1110 (3 mL)-Given       1509 (3 mL)-Given       1928 (3 mL)-Given        0714 (3 mL)-Given       1109 (3 mL)-Given       1503 (3 mL)-Given       1929 (3 mL)-Given        0706 (3 mL)-Given       1107-Med Discontinued  (1115)-Not Given                     INTERAGENCY TRANSFER FORM - NOTES (H&P, Discharge Summary, Consults, Procedures, Therapies)   3/13/2017                       [facility]: [phone]            History & Physicals     No notes of this type exist for this encounter.      Discharge Summaries     No notes of this type exist for this encounter.      Consult Notes     No notes of this type exist for this encounter.         Progress Notes - Physician (Notes for yesterday and today)      Progress Notes by Everardo Weller MD at 3/23/2017  8:14 PM     Author:  Everardo Weller MD Service:  Internal Medicine Author Type:  Physician    Filed:  3/24/2017  7:55 AM Date of Service:  3/23/2017  8:14 PM Note Created:  3/23/2017  8:14 PM    Status:  Signed :  Everardo Weller MD (Physician)         [facility]    Hospitalist Progress Note    Date of Service (when I saw the patient): 03/23/2017    Assessment & Plan[SS1.1]   Scott Liang is a 81 year old male who was admitted on 3/13/2017.     1. Bilateral pneumonia: Patient came in to ER with extreme weakness. Chest CT & xray show bilateral infiltrates with air bronchogram. No real masses seen. No fevers, no elevated WBC. 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 Unable to get any sputum for analysis. Not overly symptomatic. No distinct lymphadenopathy. Might require bronchoscopy eventually.  Procalcitonin low at 1.3. Legionella antigen negative. HIV negative. Blasto Leticia/histo Ag negative. Sputum MTB/RIF not detected. FNA cytology was negative. Not enough sample was obtained for fungal culture or gram stain/tissue culture. AFB stain and cultures from lung biopsy are yet pending. DDx including CAP versus inflammatory versus malignancy versus fungal?  - completed 8 days of ceftriaxone, azithromycin  - CT guided lung biopsy obtained 3/17/17, awaiting AFB stain/cultures still, but otherwise cytology was negative  - discussed with pulmonary at [facility] (Dr. Gao) regarding possible utility of bronchoscopy. He reviewed patient's images and agreed that bronchoscopy would be indicated. Will plan to schedule with [facility] pulmonary clinic as outpatient. Degree of bronchiectasis and bilateral upper lobe involvement on the CT suggestive of TB, however, sputum MTB/RIF negative. Lung biopsy AFB stain negative for acid fast organism and AFB cultures still pending. Per recommendation of pulmonary, will place Quantiferon gold order (do not have TSPOT available).   - given severe cachexia/malnutrition over just the past 1-2 months, still with heightened suspicion for malignancy, will plan to get CT AP with IV contrast to also eval for any evidence malignancy. Swallow function still compromised, prohibiting oral contrast  - wean O2 as able      2. Severe malnutrition: Low albumin. Severe loss of subcutaneous fat and muscle mass. Weight is 86#. Extremely cachectic. Appetite improving. Has supplements and vitamins ordered. Inhibited by swallowing dysfunction. Slowly improving swallow function. Speech path to follow.       3. Dehydration: Better with IVF. Bp stable. Is starting to void more       4. Normocytic anemia: No bleeding here. Hgb has ranged 7.4-10. Currently 8.0. Iron studies suggestive of combination of iron deficiency and anemia of chronic disease. Likely iron deficiency is nutritional deficiency.     5.  Cachexia: Unclear if is just malnutrition, or malignancy hiding some where. No palpable masses or nodes any where. Will undoubtedly need SNF for recovery after DC from here.     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 Appetite improving. No respiratory distress. No cough, no fevers. Ongoing generalized weakness    -Data reviewed today: I reviewed all new labs and imaging results over the last 24 hours. I personally reviewed no images or EKG's today.    Physical Exam   Temp: 99  F (37.2  C) Temp src: Tympanic BP: 103/59   Heart Rate: 89 Resp: 22 SpO2: (!) 89 % O2 Device: Nasal cannula Oxygen Delivery: 1/2 LPM (unknown who turned down o2, turned up to 1 LPM)  Vitals:    03/21/17 0616 03/22/17 0636 03/23/17 0614   Weight: 40.9 kg (90 lb 2.7 oz) 39.2 kg (86 lb 6.7 oz) 39.9 kg (87 lb 15.4 oz)     Vital Signs with Ranges  Temp:  [96.9  F (36.1  C)-99  F (37.2  C)] 99  F (37.2  C)  Heart Rate:  [76-89] 89  Resp:  [18-22] 22  BP: (103-116)/(45-61) 103/59  SpO2:  [83 %-97 %] 89 %      Intake/Output Summary (Last 24 hours) at 03/19/17 1223  Last data filed at 03/19/17 1155   Gross per 24 hour   Intake              763 ml   Output              710 ml    Net               53 ml   Constitutional - AA, NAD, cachectic  HEENT - atraumatic, normocephalic  Neck - supple, no masses, no JVD  CVS - S1 S2 RRR, no murmurs, rubs, gallops  Respiratory - crackles b/l   GI - cachectic, soft, NT, ND, + bowel sounds, no organomegaly  Musculoskeletal - no LE edema, thin, cachectic  Neuro - oriented x 2, no gross focal deficits     Medications     dextrose 5% and 0.45% NaCl + KCl 20 mEq/L 75 mL/hr at 03/23/17 0704       multivitamin, therapeutic with minerals  1 tablet Oral Daily     sodium chloride (PF)  3 mL Intracatheter Q8H     heparin sodium PF  5,000 Units Subcutaneous Q12H     pneumococcal vaccine  0.5 mL Intramuscular Prior to discharge     influenza Vac Split High-Dose  0.5 mL Intramuscular Prior to discharge       Data     Recent Labs  Lab 03/23/17  1730 03/23/17  0517 03/22/17  0512  03/17/17  1615   WBC 7.6 7.2 5.6  < >  --    HGB 8.6* 8.0* 8.0*  < >  --    MCV 86 86 87  < >  --     297 257  < >  --    INR  --   --   --   --  1.30*     --   --   --   --    POTASSIUM 4.3  --   --   --   --    CHLORIDE 92*  --   --   --   --    CO2 38*  --   --   --   --    BUN 9  --   --   --   --    CR 0.43*  --   --   --   --    ANIONGAP 3  --   --   --   --    SAI 8.2*  --   --   --   --    GLC 87  --   --   --   --    < > = values in this interval not displayed.       No results found for this or any previous visit (from the past 24 hour(s)).[SS1.1]       Revision History        User Key Date/Time User Provider Type Action    > SS1.2 3/24/2017  7:55 AM Everardo Weller MD Physician Sign     SS1.1 3/23/2017  8:14 PM Everardo Weller MD Physician                   Procedure Notes     No notes of this type exist for this encounter.      Progress Notes - Therapies (Notes from 03/21/17 through 03/24/17)     No notes of this type exist for this encounter.                                          INTERAGENCY TRANSFER FORM - LAB / IMAGING / EKG / EMG RESULTS   3/13/2017                        [facility]: [phone]            Unresulted Labs (24h ago through future)    Start       Ordered    03/23/17 1600  Basic metabolic panel  DAILY,   Routine      03/23/17 1552    03/23/17 1600  CBC with platelets differential  DAILY,   Routine     Comments:  Last Lab Result: Hemoglobin (g/dL)       Date                     Value                 03/23/2017               8.0 (L)          ----------    03/23/17 1552    03/16/17 0600  Platelet count  (Pharmacological Prophylaxis- heparin (If CrCl less than 30 mL/min)- HI)  EVERY THREE DAYS,   Routine     Comments:  Repeat every 3 days while on VTE prophylaxis. If no result is listed, this lab has not been done the past 365 days. LATEST LAB RESULT: Platelet Count (10e9/L)       Date                     Value                 03/13/2017               324              ----------      03/13/17 1836         Lab Results - 3 Days      Basic metabolic panel [[id number]] (Abnormal)  Resulted: 03/24/17 0702, Result status: Final result    Ordering provider: Everardo Weller MD  03/24/17 0001 Resulting lab: [facility]    Specimen Information    Type Source Collected On   Blood  03/24/17 0513          Components       Value Reference Range Flag Lab   Sodium 134 133 - 144 mmol/L  HI   Potassium 4.0 3.4 - 5.3 mmol/L  HI   Chloride 93 94 - 109 mmol/L L HI   Carbon Dioxide 39 20 - 32 mmol/L H HI   Anion Gap 2 3 - 14 mmol/L L HI   Glucose 101 70 - 99 mg/dL H HI   Urea Nitrogen 10 7 - 30 mg/dL  HI   Creatinine 0.45 0.66 - 1.25 mg/dL L HI   GFR Estimate -- >60 mL/min/1.7m2  HI   Result:         >90  Non  GFR Calc     GFR Estimate If Black -- >60 mL/min/1.7m2  HI   Result:         >90   GFR Calc     Calcium 8.1 8.5 - 10.1 mg/dL L HI   Result:              CBC with platelets differential [[id number]] (Abnormal)  Resulted: 03/24/17 0539, Result status: Final result    Ordering provider: Everardo Weller MD  03/23/17 4511  Resulting lab: [facility]    Specimen Information    Type Source Collected On   Blood  03/24/17 0513          Components       Value Reference Range Flag Lab   WBC 7.1 4.0 - 11.0 10e9/L  HI   RBC Count 3.01 4.4 - 5.9 10e12/L L HI   Hemoglobin 7.5 13.3 - 17.7 g/dL L HI   Hematocrit 25.5 40.0 - 53.0 % L HI   MCV 85 78 - 100 fl  HI   MCH 24.9 26.5 - 33.0 pg L HI   MCHC 29.4 31.5 - 36.5 g/dL L HI   RDW 16.0 10.0 - 15.0 % H HI   Platelet Count 281 150 - 450 10e9/L  HI   Diff Method Automated Method   HI   % Neutrophils 82.3 %  HI   % Lymphocytes 10.3 %  HI   % Monocytes 6.5 %  HI   % Eosinophils 0.3 %  HI   % Basophils 0.3 %  HI   % Immature Granulocytes 0.3 %  HI   Nucleated RBCs 0 0 /100  HI   Absolute Neutrophil 5.9 1.6 - 8.3 10e9/L  HI   Absolute Lymphocytes 0.7 0.8 - 5.3 10e9/L L HI   Absolute Monocytes 0.5 0.0 - 1.3 10e9/L  HI   Absolute Eosinophils 0.0 0.0 - 0.7 10e9/L  HI   Absolute Basophils 0.0 0.0 - 0.2 10e9/L  HI   Abs Immature Granulocytes 0.0 0 - 0.4 10e9/L  HI   Absolute Nucleated RBC 0.0   HI            M Tuberculosis by Quantiferon ! Follow QTB collection process [[id number]]  Resulted: 03/23/17 2014, Result status: In process    Ordering provider: Everardo Weller MD  03/23/17 184 Resulting lab: [facility]    Specimen Information    Type Source Collected On   Blood  03/23/17 2012            Basic metabolic panel [[id number]] (Abnormal)  Resulted: 03/23/17 1751, Result status: Final result    Ordering provider: Everardo Weller MD  03/23/17 8895 Resulting lab: [facility]    Specimen Information    Type Source Collected On   Blood  03/23/17 1730          Components       Value Reference Range Flag Lab   Sodium 133 133 - 144 mmol/L  HI   Potassium 4.3 3.4 - 5.3 mmol/L  HI   Chloride 92 94 - 109 mmol/L L HI   Carbon Dioxide 38 20 - 32 mmol/L H HI   Anion Gap 3 3 - 14 mmol/L  HI   Glucose 87 70 - 99 mg/dL  HI   Urea Nitrogen 9 7 - 30 mg/dL  HI   Creatinine 0.43 0.66 - 1.25  mg/dL L HI   GFR Estimate -- >60 mL/min/1.7m2  HI   Result:         >90  Non  GFR Calc     GFR Estimate If Black -- >60 mL/min/1.7m2  HI   Result:         >90   GFR Calc     Calcium 8.2 8.5 - 10.1 mg/dL L HI   Result:              CBC with platelets differential [[id number]] (Abnormal)  Resulted: 03/23/17 1742, Result status: Final result    Ordering provider: Everardo Weller MD  03/23/17 1552 Resulting lab: [facility]    Specimen Information    Type Source Collected On   Blood  03/23/17 1730          Components       Value Reference Range Flag Lab   WBC 7.6 4.0 - 11.0 10e9/L  HI   RBC Count 3.34 4.4 - 5.9 10e12/L L HI   Hemoglobin 8.6 13.3 - 17.7 g/dL L HI   Hematocrit 28.8 40.0 - 53.0 % L HI   MCV 86 78 - 100 fl  HI   MCH 25.7 26.5 - 33.0 pg L HI   MCHC 29.9 31.5 - 36.5 g/dL L HI   RDW 15.9 10.0 - 15.0 % H HI   Platelet Count 257 150 - 450 10e9/L  HI   Diff Method Automated Method   HI   % Neutrophils 79.5 %  HI   % Lymphocytes 12.4 %  HI   % Monocytes 7.3 %  HI   % Eosinophils 0.4 %  HI   % Basophils 0.1 %  HI   % Immature Granulocytes 0.3 %  HI   Nucleated RBCs 0 0 /100  HI   Absolute Neutrophil 6.0 1.6 - 8.3 10e9/L  HI   Absolute Lymphocytes 0.9 0.8 - 5.3 10e9/L  HI   Absolute Monocytes 0.6 0.0 - 1.3 10e9/L  HI   Absolute Eosinophils 0.0 0.0 - 0.7 10e9/L  HI   Absolute Basophils 0.0 0.0 - 0.2 10e9/L  HI   Abs Immature Granulocytes 0.0 0 - 0.4 10e9/L  HI   Absolute Nucleated RBC 0.0   HI            AFB Culture Non Blood [[id number]]  Resulted: 03/23/17 1620, Result status: Preliminary result    Ordering provider: Nick Woodard MD  03/16/17 1534 Resulting lab: [facility]    Specimen Information    Type Source Collected On   Sputum  03/17/17 1515          Components       Value Reference Range Flag Lab   Specimen Description Tissue   HI   Special Requests LT LUNG FNA   HI   Culture Micro --   HI   Result:         Culture received and in  progress.  Positive AFB results are called as soon as   detected.  Final report to follow in 7 to 8 weeks.  Assayed at [address] [address]     Micro Report Status Pending   HI   Result:              AFB Stain Non Blood [[id number]]  Resulted: 03/23/17 1619, Result status: Final result    Ordering provider: Nick Woodard MD  03/16/17 1535 Resulting lab: [facility]    Specimen Information    Type Source Collected On   Sputum  03/17/17 1515          Components       Value Reference Range Flag Lab   Specimen Description Tissue   HI   Special Requests LT LUNG FNA   HI   AFB Stain --   HI   Result:         Negative for acid fast bacteria  Less than 5ml of specimen received. A minimum of 5 mL of sputum or fluid is   recommended for recovery of acid fast bacilli (AFB).  Volumes less than 5 mL   are suboptimal and may compromise recovery of AFB from culture.  Assayed at [facility] [address]     Micro Report Status FINAL 03/23/2017   HI   Result:              CBC with platelets differential [[id number]] (Abnormal)  Resulted: 03/23/17 0534, Result status: Final result    Ordering provider: Nick Woodard MD  03/22/17 2300 Resulting lab: [facility]    Specimen Information    Type Source Collected On   Blood  03/23/17 0517          Components       Value Reference Range Flag Lab   WBC 7.2 4.0 - 11.0 10e9/L  HI   RBC Count 3.19 4.4 - 5.9 10e12/L L HI   Hemoglobin 8.0 13.3 - 17.7 g/dL L HI   Hematocrit 27.4 40.0 - 53.0 % L HI   MCV 86 78 - 100 fl  HI   MCH 25.1 26.5 - 33.0 pg L HI   MCHC 29.2 31.5 - 36.5 g/dL L HI   RDW 16.0 10.0 - 15.0 % H HI   Platelet Count 297 150 - 450 10e9/L  HI   Diff Method Automated Method   HI   % Neutrophils 79.4 %  HI   % Lymphocytes 13.7 %  HI   % Monocytes 6.1 %  HI   % Eosinophils 0.3 %  HI   % Basophils 0.1 %  HI   % Immature Granulocytes 0.4 %  HI   Nucleated RBCs 0 0 /100  HI   Absolute Neutrophil 5.7 1.6  - 8.3 10e9/L  HI   Absolute Lymphocytes 1.0 0.8 - 5.3 10e9/L  HI   Absolute Monocytes 0.4 0.0 - 1.3 10e9/L  HI   Absolute Eosinophils 0.0 0.0 - 0.7 10e9/L  HI   Absolute Basophils 0.0 0.0 - 0.2 10e9/L  HI   Abs Immature Granulocytes 0.0 0 - 0.4 10e9/L  HI   Absolute Nucleated RBC 0.0   HI            Histoplasma capsulatum antigen [[id number]]  Resulted: 03/22/17 1622, Result status: Edited Result - FINAL    Ordering provider: Nick Woodard MD  03/16/17 2300 Resulting lab: [facility]    Specimen Information    Type Source Collected On   Blood  03/17/17 0500          Components       Value Reference Range Flag Lab   Lab Scanned Result HISTOPLASMA AGN BLOOD-Scanned               Ferritin [[id number]]  Resulted: 03/22/17 0618, Result status: Final result    Ordering provider: Everardo Weller MD  03/22/17 0000 Resulting lab: [facility]    Specimen Information    Type Source Collected On   Blood  03/22/17 0512          Components       Value Reference Range Flag Lab   Ferritin 286 26 - 388 ng/mL  HI            Iron and iron binding capacity [[id number]] (Abnormal)  Resulted: 03/22/17 0618, Result status: Final result    Ordering provider: Everardo Weller MD  03/22/17 0000 Resulting lab: [facility]    Specimen Information    Type Source Collected On   Blood  03/22/17 0512          Components       Value Reference Range Flag Lab   Iron 12 35 - 180 ug/dL L HI   Iron Binding Cap 112 240 - 430 ug/dL L HI   Iron Saturation Index 11 15 - 46 % L HI            CBC with platelets differential [[id number]] (Abnormal)  Resulted: 03/22/17 0529, Result status: Final result    Ordering provider: Nick Woodard MD  03/21/17 2300 Resulting lab: [facility]    Specimen Information    Type Source Collected On   Blood  03/22/17 0512          Components       Value Reference Range Flag Lab   WBC 5.6 4.0 - 11.0 10e9/L  HI   RBC Count 3.13 4.4 - 5.9 10e12/L L HI   Hemoglobin 8.0 13.3 - 17.7  g/dL L HI   Hematocrit 27.3 40.0 - 53.0 % L HI   MCV 87 78 - 100 fl  HI   MCH 25.6 26.5 - 33.0 pg L HI   MCHC 29.3 31.5 - 36.5 g/dL L HI   RDW 15.9 10.0 - 15.0 % H HI   Platelet Count 257 150 - 450 10e9/L  HI   Diff Method Automated Method   HI   % Neutrophils 77.7 %  HI   % Lymphocytes 13.6 %  HI   % Monocytes 7.7 %  HI   % Eosinophils 0.4 %  HI   % Basophils 0.2 %  HI   % Immature Granulocytes 0.4 %  HI   Nucleated RBCs 0 0 /100  HI   Absolute Neutrophil 4.3 1.6 - 8.3 10e9/L  HI   Absolute Lymphocytes 0.8 0.8 - 5.3 10e9/L  HI   Absolute Monocytes 0.4 0.0 - 1.3 10e9/L  HI   Absolute Eosinophils 0.0 0.0 - 0.7 10e9/L  HI   Absolute Basophils 0.0 0.0 - 0.2 10e9/L  HI   Abs Immature Granulocytes 0.0 0 - 0.4 10e9/L  HI   Absolute Nucleated RBC 0.0   HI            Glucose by meter [[id number]] (Abnormal)  Resulted: 03/21/17 1240, Result status: Final result    Ordering provider: Brayan Carroll MD  03/21/17 1225 Resulting lab: POINT OF CARE TEST, GLUCOSE    Specimen Information    Type Source Collected On     03/21/17 1225          Components       Value Reference Range Flag Lab   Glucose 125 70 - 99 mg/dL H 170            CBC with platelets differential [[id number]] (Abnormal)  Resulted: 03/21/17 0616, Result status: Final result    Ordering provider: Nick Woodard MD  03/20/17 2300 Resulting lab: [facility]    Specimen Information    Type Source Collected On   Blood  03/21/17 0554          Components       Value Reference Range Flag Lab   WBC 6.1 4.0 - 11.0 10e9/L  HI   RBC Count 3.37 4.4 - 5.9 10e12/L L HI   Hemoglobin 8.5 13.3 - 17.7 g/dL L HI   Hematocrit 29.7 40.0 - 53.0 % L HI   MCV 88 78 - 100 fl  HI   MCH 25.2 26.5 - 33.0 pg L HI   MCHC 28.6 31.5 - 36.5 g/dL L HI   RDW 16.2 10.0 - 15.0 % H HI   Platelet Count 251 150 - 450 10e9/L  HI   Diff Method Automated Method   HI   % Neutrophils 78.6 %  HI   % Lymphocytes 12.7 %  HI   % Monocytes 7.4 %  HI   % Eosinophils 0.8 %  HI   % Basophils 0.3 %   HI   % Immature Granulocytes 0.2 %  HI   Nucleated RBCs 0 0 /100  HI   Absolute Neutrophil 4.8 1.6 - 8.3 10e9/L  HI   Absolute Lymphocytes 0.8 0.8 - 5.3 10e9/L  HI   Absolute Monocytes 0.5 0.0 - 1.3 10e9/L  HI   Absolute Eosinophils 0.1 0.0 - 0.7 10e9/L  HI   Absolute Basophils 0.0 0.0 - 0.2 10e9/L  HI   Abs Immature Granulocytes 0.0 0 - 0.4 10e9/L  HI   Absolute Nucleated RBC 0.0   HI            Testing Performed By     Lab - Abbreviation Name Director Address Valid Date Range    45 - SEM814 [facility] Unknown Unknown 01/28/02 0000 - Present    170 - Unknown POINT OF CARE TEST, GLUCOSE Unknown Unknown 10/31/11 1114 - Present    210 - [facility] Unknown [address] [address] 05/08/15 1057 - Present               Imaging Results - 3 Days      CT Abdomen Pelvis w Contrast [[id number]]  Resulted: 03/23/17 2334, Result status: Final result    Ordering provider: Everardo Weller MD  03/23/17 1450 Resulted by: Syed Juan MD    Performed: 03/23/17 1500 - 03/23/17 1959 Resulting lab: [facility]    Narrative:           CT SCAN OF ABDOMEN AND PELVIS WITH IV CONTRAST    REPORT: There are bibasilar pleural effusions.  Confluent opacities  are seen in the right lower lobe.  These are suspicious for pneumonia.  The liver is free of masses.  There is no biliary duct enlargement.  The patient appears to have undergone cholecystectomy. The spleen and  pancreas appear normal. There are no adrenal masses. There is dilation  of both renal collecting systems and a markedly extended bladder,  extending above the umbilicus.  The hydronephrosis is most likely due  to bladder distention.  The periaortic lymph nodes are normal in  caliber. The rectum appears normal.  There is a compression fracture  of the L1 vertebra of uncertain age.  Degenerative changes present  throughout the lumbar spine, most severe at L5-S1.    IMPRESSION:  1.  MARKEDLY DISTENDED BLADDER WITH BILATERAL HYDRONEPHROSIS  AND  HYDROURETER, MOST LIKELY DUE TO THE BLADDER DILATION.    2.  BILATERAL PLEURAL EFFUSIONS.    3.  CONFLUENT ALVEOLAR OPACITY IN THE RIGHT LOWER LOBE, SUSPICIOUS FOR  PNEUMONIA.                        SIGNATURE PAGE ONLY  Exam Date: Mar 23, 2017 07:59:00 PM  Author: SUNNI KISER  This report is final and signed      Specimen Information    Type Source Collected On     03/23/17 1955            XR Chest Port 1 View [[id number]]  Resulted: 03/22/17 1501, Result status: Final result    Ordering provider: Everardo Weller MD  03/21/17 1626 Resulted by: Ministerio Hatch MD    Performed: 03/21/17 1630 - 03/21/17 1645 Resulting lab: [facility]    Narrative:           AP PORTABLE CHEST    FINDINGS: The cardiac silhouette is normal.  There is cicatricial  change in both upper lobes extending from the sarah to the lung apices  causing hilar elevation, right greater than left.  The cicatricial  change has caused deviation of the trachea towards the right.  Homogenous moderate opacities are present at both lung apices, with  embedded bullae on the right.  The right mid and lower lung zones are  essentially clear, with blunting of the right costophrenic angle.  On  the left there is hazy, relatively patchy opacity in the mid lung  zone, and blunting of the let costophrenic angle.    IMPRESSION:  COMPARISON IS MADE TO THE MARCH 16, 2017 EXAMINATION.  THE OVERALL AMOUNT OF OPACITY IN THE LEFT MID AND LOWER LUNG ZONE HAS  CHANGED IN DISTRIBUTION, NOW INVOLVING MORE OF THE MID LUNG REGION.  THE AMOUNT OF LEFT COSTOPHRENIC ANGLE BLUNTING HAS IMPROVED.  THE  RIGHT COSTOPHRENIC ANGLE BLUNTING HAS WORSENED.  THE APPEARANCE OF THE  RIGHT HEMITHORAX IS OTHERWISE STABLE.  THE FINDINGS ARE COMPATIBLE  WITH BILATERAL FIBROSIS INVOLVING THE UPPER LOBES, WITH SLIGHT  INTERVAL IMPROVEMENT IN THE ACUTE PROCESS INVOLVING THE LEFT MID AND  LOWER LUNG ZONE.  THERE DOES APPEAR TO BE A NEW SMALL RIGHT-SIDED  PLEURAL EFFUSION.  Exam Date: Mar 21,  2017 04:45:00 PM  Author: RACHEL ARCEO  This report is final and signed      Specimen Information    Type Source Collected On     03/21/17 1638            Testing Performed By     Lab - Abbreviation Name Director Address Valid Date Range    183 - [facility] Unknown Unknown 11/29/12 1227 - Present            Encounter-Level Documents:     There are no encounter-level documents.      Order-Level Documents:     There are no order-level documents.

## 2017-03-13 NOTE — IP AVS SNAPSHOT
MRN:[id number]                      After Visit Summary   3/13/2017    Scott Liang    MRN: [id number]           Thank you!     Thank you for choosing [facility] for your care. Our goal is always to provide you with excellent care. Hearing back from our patients is one way we can continue to improve our services. Please take a few minutes to complete the written survey that you may receive in the mail after you visit with us. Thank you!        Patient Information     Date Of Birth          1935        About your hospital stay     You were admitted on:  March 13, 2017 You last received care in the:  [facility]    You were discharged on:  March 24, 2017       Who to Call     For medical emergencies, please call 911.  For non-urgent questions about your medical care, please call your primary care provider or clinic, None          Attending Provider     Provider Specialty    Nena Dalton PA-C Emergency Medicine    Brayan Carroll MD Internal Medicine    Nick Woodard MD Internal Medicine    Everardo Weller MD Internal Medicine       Primary Care Provider    None       No address on file        Your next 10 appointments already scheduled     Mar 29, 2017 10:30 AM CDT   (Arrive by 10:15 AM)   Office Visit with Lisa Reed MD   [facility] ([facility])    [address] [place] [address]  [place] [address] [phone]           Bring a current list of meds and any records pertaining to this visit.  For Physicals, please bring immunization records and any forms needing to be filled out.    Please arrive 15 minutes early to complete paperwork and register.              Pending Results     Date and Time Order Name Status Description    3/23/2017 1849 M Tuberculosis by Quantiferon ! Follow QTB collection process In process     3/17/2017 1108 Cytology non gyn In process     3/16/2017 1535 AFB Culture Non Blood Preliminary             Statement of Approval     Ordered     "      17 1416  I have reviewed and agree with all the recommendations and orders detailed in this document.  EFFECTIVE NOW     Approved and electronically signed by:  Everardo Weller MD             Admission Information     Date & Time Provider Department Dept. Phone    3/13/2017 Everardo Weller MD [facility] [phone]      Your Vitals Were     Blood Pressure Pulse Temperature Respirations Height Weight    99/51 80 99.2  F (37.3  C) (Tympanic) 18 1.778 m (5' 10\") 39.2 kg (86 lb 6.7 oz)    Pulse Oximetry BMI (Body Mass Index)                90% 12.4 kg/m2          MyChart Information     [employer] lets you send messages to your doctor, view your test results, renew your prescriptions, schedule appointments and more. To sign up, go to [email or web address] . Click on \"Log in\" on the left side of the screen, which will take you to the Welcome page. Then click on \"Sign up Now\" on the right side of the page.     You will be asked to enter the access code listed below, as well as some personal information. Please follow the directions to create your username and password.     Your access code is: [email or web address]  Expires: 2017 11:01 AM     Your access code will  in 90 days. If you need help or a new code, please call your [facility] clinic or [phone].        Care EveryWhere ID     This is your Care EveryWhere ID. This could be used by other organizations to access your [facility] medical records  [id number]           Review of your medicines      Notice     You have not been prescribed any medications.             Protect others around you: Learn how to safely use, store and throw away your medicines at www.disposemymeds.org.             Medication List: This is a list of all your medications and when to take them. Check marks below indicate your daily home schedule. Keep this list as a reference.      Notice     You have not been prescribed any medications.      "

## 2017-03-13 NOTE — ED NOTES
Nurse to nurse report given to Cathleen LEMONS and pt transferred to 3rd floor by house supervisor.  IV site is patent and retaped with IVFs and IV atbx infusing per MD order. Zithromax sent to the floor.  Denies pain.  VS as charted. Pts brother is goen now and will return tomorrow morning.

## 2017-03-14 NOTE — H&P
CHIEF COMPLAINT:  Weakness.      HISTORY OF PRESENT ILLNESS:  Mr. Scott Liang is a 81-year-old gentleman who was brought to the ER by his brother for evaluation of weakness, weight loss as well as a cough.  Mr. Liang has really never been in to see any health care providers for most of his life.  He lives with a brother here in [place].  In speaking with Mr. Liang, he is not very forthcoming with a lot of information, but from what the ER physician said, he has been living with his brother and has not been out of bed for days and just been progressively weaker and weaker over the last months.  He has had no recent travel at all.  He just feels very weak and not much appetite.  Has not had any chest pains, denies any real shortness of breath, cough, purulent sputum, no hemoptysis, no headache, double vision, denies any abdominal pain.  No trouble swallowing.  Denies any diarrhea or constipation, no dysuria, no hematuria at all.  No peripheral edema at all.  No skin rashes.  Denies any sore joints at all.  As I said, he really denies any big issues at all.  For the most part says he does have food at the home, he has just not been hungry at all or eaten.      In the ER here, he was hemodynamically stable.  He is relatively hypotensive.  He was afebrile.  Sats were actually okay when he initially came to 92%, but they did drop down somewhat during the ER stay here, has been on a couple liters of O2.  He has had no fevers here.  In the ER he got a little bit of fluid and got some antibiotics, got some Rocephin and azithromycin.  He is admitted up here now for further evaluation.      In speaking with the patient, he is not very forthcoming, answers very simple questions but denies anything other than just feeling weak.  He is extremely cachectic and has lost a lot of weight.  He is not sure how much he has lost or over what time period, but he has been just very weak and was unable to get out of bed for the  last week or so at least.  Otherwise, I cannot get any real good history from him at all.  His brother is not available for any further information.      PAST MEDICAL HISTORY:  This man has not been in the hospital, has no other medical history that he is aware of.      MEDICATIONS:  He is on no medications.      ALLERGIES:  He has no known drug allergies.        PAST SURGICAL HISTORY:  He has no abdominal surgeries ever.  He has had no broken bones.      SOCIAL HISTORY:  He is a nonsmoker.  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        FAMILY HISTORY:  He has 2 sisters and a brother that he lives with.  They are all alive and well.  There is no family history of any disease process or known cancers.      REVIEW OF SYSTEMS:  As noted above.  Pertinent positives.      PHYSICAL EXAMINATION:   GENERAL:  He is extremely cachectic gentleman.   VITAL SIGNS:  Blood pressure currently is 107/53, pulse is 115, respiratory rate 18, temperature 94% sat on 2 liters.  His weight is 39.7 kg.   HEENT:  He is very cachectic.  Sclerae are clear.   NECK:  Decreased in terms of its range of motion due to arthritis.  His mucous membranes are very dry.  I cannot palpate any lymphadenopathy or thyromegaly at all.   LUNGS:  He has decreased breath sounds throughout.  No obvious wheezes.  Maybe a few little areas of some congestion or crackles of the upper lobe but really minimal   CARDIAC:  Regular rate and rhythm, normal S1, S2.  No audible murmurs, rubs or gallops.  Neck veins are flat.  I hear no carotid bruits.  He has good peripheral pulses.  I hear no other bruits anywhere.   ABDOMEN:  Very thin.  I cannot palpate any obvious masses or organomegaly.  His bowel sounds are positive.   EXTREMITIES:  He has no cyanosis, clubbing or edema.   NEUROLOGIC:  He is alert and oriented x3.  Cranial nerves are intact.  He moves all extremities.  He is extremely weakened.      IMAGING:  On CT scan of his chest,  he has severe emphysematous changes that are seen throughout.  He has some patchy areas of consolidation throughout lingula in the left lower lobe, calcified plaque was seen in posterior aspect of the right hemithorax.  Consolidation in the lateral segment of the left upper lobe with bronchiectasis.  He has peripheral consolidation, marked bronchiectasis throughout the right upper lobe.  Mildly prominent lymph nodes.  The upper parts of the abdomen are unremarkable.  His chest x-ray shows similar findings as his CT scan showed.  It showed opacity in both lungs, particularly on the left and nodular appearance, all this is suspicious for possible malignancy.      LABORATORY DATA:  Shows that his sodium is 142, potassium 5.1, chloride 100, CO2 is 34, BUN 34, creatinine is 0.58, anion gap is 8.  Calcium is 8.5, glucose is 109.  White count is 11,700, hemoglobin is 10, platelet count is 324,000, MCV is 89.  His urinalysis specific gravity of 1.041, pH was 5.5, albumin is 30 mg/dL, positive nitrites, trace blood, moderate leukocyte esterase, 60 whites, 71 RBCs, moderate amount of bacteria, some white blood cell clumps were noted.  He had a couple blood cultures performed.  No urine culture unfortunately was done.      ASSESSMENT:   1.  Bilateral infiltrates.  The patient presents with a significant weight loss, cachexia, has bilateral infiltrates.  Does have a mildly elevated white count.  He denies any cough, purulent sputum, or fevers.  Suspicion for malignancy is quite high in this, but we will treat him as if he possibly has an underlying community-acquired pneumonia.  He has had no trauma, no other things that should cause a problem here.  He was given azithromycin and Rocephin.  Will try and obtain sputum for Gram stain, culture and cytology if possible.  He is mildly hypoxic, will give him some O2 and also some Duonebs at this point.  I am not sure how his history is reliable or not, whether he has been a smoker,  he denies it at this point, however.  Will try to get more information from his brother.   2.  Weight loss.  The patient is extremely cachectic.  His appetite has been poor.  He denies any GI complaints whatsoever.  His liver profile is pending at this time.  Hemoglobin is 10 and is anemic.  We will try and get a stool, guaiac this if we can.  I cannot palpate any masses in his abdomen at all.  He states he is hungry.  We will feed him and see how he does with that.   3.  Acute volume depletion.  The patient certainly appears to be quite dry.  BUN is markedly elevated with his creatinine, his urine is extremely concentrated.  We will give him some IV fluids here and monitor his I's and O's and weights at this point.   4.  Possible urinary tract infection.  He has a lot of urine some white cells and red cells.  Unfortunately, culture was not ordered.  Will try and order this on what was sent down from the ER earlier.  Current antibiotic should cover this if he does have an infection.  So, overall we have a gentleman who has not sought any kind of medical care, a 81-year-old who comes in cachectic with bilateral infiltrates, weight loss, suspicion for malignancy is high.  He also is on the volume depleted side, will give him fluids, current antibiotics and try and sort out the etiology for his multiple problems.         ADRIÁN PAZ MD             D: 2017 18:55   T: 2017 21:31   MT: JOJO      Name:     MARY BETH ECHOLS   MRN:      [id number]        Account:      [id number]   :      1935           Admitted:     [id number]      Document: [id number]

## 2017-03-14 NOTE — PLAN OF CARE
Face to face report given with opportunity to observe patient.  Report given to Yamilex LEMONS.    Mariama Underwood  3/14/2017, 3:46 PM

## 2017-03-14 NOTE — PLAN OF CARE
"VSS except for need of 2 LPM to maintain O2 sats above 92%. Pt appears very emaciated stating a low oral intake over the past few weeks. Weak unproductive cough when nurse walked into the room when pt was eating, stating it \"may have gone down the wrong tube.\" Pt was very anxious when asked to take pants off for a skin assessment needing a lot of reassurance. Skin is dry and extremely flaky at feet, pants smelling of urine. Spine is red but blanchable, pt repositioned onto different sides throughout shift. No c/o pain. Lung sounds clear but diminished. Attempting to exit bed without using call light, alarms present.     Voided small amount on night shift. Receiving IV fluid and IV antibiotics.     Face to face report given with opportunity to observe patient.    Report given to VESTA Leslie   3/14/2017  6:56 AM    "

## 2017-03-14 NOTE — PLAN OF CARE
Problem: Patient Goal: Social Work Focus  Goal: 1. Patient Goal: Social Work Focus  Participated in Care Rounds and met with Scott today. He would like to establish care with a female PCP, he goes to [facility] was last seen there a couple years ago. Will provide information on a healthcare directive. He would like to use [facility]. He is a  but [facility] not [facility]. He currently weighs 87 lbs, states he eats chicken noodle soup 2X daily. Says there is food in the house, he just does not feel like eating, however he shared with nursing staff that all they have is apple juice. States he feels safe at home and the home is well maintained. Will provide the Medicare Part B application and a healthcare directive paperwork and information. Establish care appointment scheduled with Leonela Saldivar on March 23 at 115 PM. Left a voice mail for the FABBY Voss at [phone] am awaiting a return call.

## 2017-03-14 NOTE — PLAN OF CARE
"Pt awake and alert resting on R side at this time. Denies pain or nausea. When questioned pt regarding food issues pt stated he hasn't been eating because \"we have no food\". Also stated brother doesn't feed him. Pt appears almost skeletal. States last known intake was \"probably some apple juice\". When asked if he has been getting up- pt states he hasn't and usually stays in one place when questioned pt regarding turning in bed. Redness noted to spine but skin intact. Very  Flaky and  Dry feet with cracked areas to heels. Pt appears filthy including teeth. Attempts made to bedbath- will try to get in shower. Able to get OOB to chair for breakfast with assist. When asked if he wanted breakfast- pt stated \"if you have any\". Tolerating food at this time  "

## 2017-03-14 NOTE — PLAN OF CARE
[facility] Inpatient Admission Note:    Patient admitted to 3208/3208-1 at approximately 1758 via cart accompanied by transport tech from emergency room . Report received from Doreen in SBAR format at 1738 via telephone. Patient ambulated to bed via self.. Patient is alert and oriented X 3, denies pain; rates at 0 on 0-10 scale.  Patient oriented to room, unit, hourly rounding, and plan of care. Explained admission packet and patient handbook with patient bill of rights brochure. Will continue to monitor and document as needed.     Inpatient Nursing criteria listed below was met:      Health care directives status obtained and documented: Yes      Care Everywhere authorization obtained No      MRSA swab completed for patient 65 years and older: Yes      Patient identifies a surrogate decision maker: No If yes, who: doesn't list Contact Information:N/A      Core Measure diagnosis present: yes. If yes, state diagnosis: pneumonia       If initial lactic acid >2.0, repeat lactic acid drawn within one hour of arrival to unit: No. If no, state reason: not drawn in ER      Vaccination assessment and education completed: Yes   Vaccinations received prior to admission: Pneumovax no  Influenza(seasonal)  NO   Vaccination(s) ordered: influenza vaccine, pneumococcal vaccine      Clergy visit ordered if patient requests: No      Skin issues/needs documented: Yes      Isolation Patient: no Education given, correct sign in place and documentation row added to PCS:  No      Fall Prevention Yes: Care plan updated, education given and documented, sticker and magnet in place: Yes      Care Plan initiated: Yes      Education Documented (including assessment): Yes      Patient has discharge needs : Yes If yes, please explain: possible homecare

## 2017-03-14 NOTE — PLAN OF CARE
Problem: Patient Goal: Rt Focus  Goal: 1. Patient Goal: RT Focus  Pt will return to baseline respiratory status  [facility] - Respiratory Clinical Assessment     Current Patient History:    Respiratory History: pneumonia    Smoking History: none    Oxygen dependency: No,    Oxygen prescribed: none     3/13/2017 7:31 PM Patient Initial Assessment:     Level of Consciousness: alert , calm    Skin color: pale    Lung sounds:dim and clear    Respiratory symptoms: very weak cough    Cough/Sputum:  dry and weak    Current oxygenation status: 87% on 2L, increased to 3L

## 2017-03-14 NOTE — PLAN OF CARE
Problem: Patient Goal: Rt Focus  Goal: 1. Patient Goal: RT Focus  Pt will return to baseline respiratory status   Outcome: No Change  Pt is taking all nebulizer's as scheduled. 91-94% on 1LPM with clear and diminished BS. Unable to obtain sputum sample. Pt has weak non productive cough.

## 2017-03-14 NOTE — PLAN OF CARE
Problem: Goal Outcome Summary  Goal: Goal Outcome Summary  Outcome: No Change  Pt has a very weak nonproductive cough without sputum production- unable to send sample. Chest with occasional rhonchi. Wearing O2 at 1LNC with sats at 94%. Has refused numerous attempts to shower- did have bed bath per UA this a.m. Incontinent of urine- new order for legionella received. Has eaten large amounts of food- orders called in with assist. Turning schedule per 2hrs- pt scoots right back onto preferred L side. Heels elevated off bed. Skin surprisingly patent. Has denied pain throughout shift. No nausea or emesis . VSS. Afebrile. 96.6 and 98.8T. Sat in chair x1 for short time today. IVF infusing at 125cc/hr. Receiving IV antibiotics.

## 2017-03-14 NOTE — PROGRESS NOTES
"Pt referred via nutrition indicator list with weight loss.  81 yom admitted with bilateral pneumonia.      Ht-70\", Wt-88#.  IBWR is 149-183#.  BMI is 12.6.  Weight hx is unknown.      Labs/meds reviewed.  Albumin is 1.5.      Regular diet ordered.  Meal intake documented at 25%, 50%, 100%.  Nursing note today states pt consumed large amounts of food.      Pt appears to be very cachexic and malnourished - chart review notes may be social issue vs underlying malignancy.  Pt will need placement in facility where meals can be provided to him.  Risk of pressure sore development is high r/t low weight.  Suggest Ensure Enlive be offered 2x/day for additional nutrition along with multivitamin with minerals.  Monitor labs for any signs of refeeding syndrome.  RD will follow intake.    "

## 2017-03-14 NOTE — PROGRESS NOTES
[facility]    Hospitalist Progress Note    Date of Service (when I saw the patient): 03/14/2017    Assessment & Plan   Scott Liang is a 81 year old male who was admitted on 3/13/2017.     1.  Bilateral pneumonia:  Patient came in to ER with extreme weakness.  Chest CT & xray show bilateral infiltrates with air bronchogram.  No real masses seen.  No fevers, no elevated WBC.  Non smoker.  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  Not really mounting any kind of inflammatory response to this pneumonia  Unable to get any sputum for analysis.  Is on rocephin and azithromycin.  Is on 1 liter of O2  Not overly symptomatic.  Discussed with radiology and this looks more infectious with air bronchogram's no discrete masses that would be biopsy targets.   Will check HIV status.  Might require bronchoscopy.  Will send off some studies to try and identify..    2. Severe malnutrition: Low albumin  Weight is 79#. Extremely cachectic.  Lives with brother  Unclear of the living situation that is there. [employer] is involved.    3.   Dehydration:  Better with IVF. Bp stable.    4.  Anemia  No bleeding here.  Will follow send of sme studies  MCV looks normal      5.  Cachexia:  Unclear if is just malnutrition, or malignancy hiding some where.  No palpable masses or nodes any where. Will undoubtedly need SNF for recovery after DC from here.      DVT Prophylaxis: Heparin SQ  Code Status: Full Code    Disposition: Expected discharge in 2-4 days once sort out his infiltrates..    Brayan Carroll    Interval History   Nothing overnight  Tried to et sputum sample no go  RT unable to induce sample either.    -Data reviewed today: I reviewed all new labs and imaging results over the last 24 hours. I personally reviewed no images or EKG's today.    Physical Exam   Temp: 96.6  F (35.9  C) Temp src: Tympanic BP: 103/56 Pulse: 86 Heart Rate: 83 Resp: 18 SpO2: (!) 88 % O2 Device: Nasal cannula Oxygen Delivery: 1 LPM  Vitals:     03/13/17 1805 03/14/17 0658   Weight: 39.7 kg (87 lb 8.4 oz) 39.9 kg (87 lb 15.4 oz)     Vital Signs with Ranges  Temp:  [96.6  F (35.9  C)-98.7  F (37.1  C)] 96.6  F (35.9  C)  Pulse:  [86] 86  Heart Rate:  [] 83  Resp:  [16-18] 18  BP: ()/(50-68) 103/56  SpO2:  [75 %-99 %] 88 %  I/O last 3 completed shifts:  In: 1817 [P.O.:120; I.V.:1697]  Out: 220 [Urine:220]    Peripheral IV 03/13/17 Left Upper arm (Active)   Site Assessment WDL 3/14/2017  8:00 AM   Line Status Infusing 3/14/2017  8:00 AM   Phlebitis Scale 0-->no symptoms 3/14/2017  8:00 AM   Infiltration Scale 0 3/14/2017  8:00 AM   Extravasation? No 3/14/2017  1:10 AM   Number of days:1     No line/device    Constitutional: Alert and oriented x3. No distress    HEENT: NC/AT, PERRL, EOMI, mouth dry, neck supple, no LN.     Cardiovascular: RRR. no Murmur, no  rubs, or gallops.  JVD flat.  Bruits no.  Pulses 2+    Respiratory:    No wheezes. Some scattered rhonchi.     Abdomen: Soft, NTND, no organomegaly. Bowel sounds present    Extremities: Warm/dry. noedema    Neuro:   Non focal, cranial nerves intact, Moves all extremities.    Medications     dextrose 5% and 0.45% NaCl + KCl 20 mEq/L 125 mL/hr at 03/14/17 1105       sodium chloride (PF)  3 mL Intracatheter Q8H     heparin sodium PF  5,000 Units Subcutaneous Q12H     azithromycin  500 mg Intravenous Q24H     cefTRIAXone  1 g Intravenous Q24H     ipratropium - albuterol 0.5 mg/2.5 mg/3 mL  3 mL Nebulization 4x daily     pneumococcal vaccine  0.5 mL Intramuscular Prior to discharge     influenza Vac Split High-Dose  0.5 mL Intramuscular Prior to discharge       Data     Recent Labs  Lab 03/14/17  0520 03/13/17  1320   WBC 8.0 11.7*   HGB 8.2* 10.0*   MCV 89 89    324   INR 1.25*  --     142   POTASSIUM 4.0 5.1   CHLORIDE 106 100   CO2 33* 34*   BUN 25 34*   CR 0.51* 0.58*   ANIONGAP 5 8   SAI 8.2* 8.5   * 109*   ALBUMIN 1.5* 2.0*   PROTTOTAL 6.4* 8.0   BILITOTAL 0.1* 0.3   ALKPHOS  74 94   ALT 8 12   AST 13 16          Recent Results (from the past 24 hour(s))   XR Chest Port 1 View    Narrative    CHEST SINGLE VIEW    FINDINGS:  There are confluent opacities in both lungs in the upper  portion.  Bilateral apical pleural thickening is noted.  Interstitial  thickening is also seen, worse on the left than the right.  Upward  retraction of sarah are seen bilaterally.    IMPRESSION:  CONFLUENT OPACITIES IN BOTH LUNGS, PARTICULARLY ON THE  LEFT.  THERE IS A NODULAR APPEARANCE WHICH WOULD BE SUSPICIOUS FOR  MALIGNANCY.  I WOULD RECOMMEND A CHEST CT BE OBTAINED.  Exam Date: Mar 13, 2017 02:29:30 PM  Author: SUNNI KISER  This report is final and signed     CT Chest w Contrast    Narrative    CHEST CT    COMPARISON:  Today's study is compared to a prior chest x-ray from  earlier the same day.    FINDINGS:  Severe emphysematous changes are seen throughout both  lungs.  There are patchy areas of alveolar consolidation seen  throughout the lingula and left lower lobe.  Calcified plaque  formation is seen about the posterior aspects of the right  hemithorax.    There is consolidation along the lateral segment of the left upper  lobe, associated with bronchiectasis.  Additionally, there is  peripheral consolidation and marked bronchiectasis seen throughout the  right upper lobe.  Lymph nodes of the mediastinum are mildly  prominent.  There is no evidence of an acute fracture.  Visualized  portions of the upper abdomen are unremarkable.    IMPRESSION:  1.  SEVERE EMPHYSEMA WITH MULTIFOCAL PNEUMONIA, LIKELY CHRONIC WITHIN  THE UPPER LOBES.    2.  SOMEWHAT NODULAR ALVEOLAR CONSOLIDATION SEEN WITHIN THE LOWER  LOBES.  THE POSSIBILITY OF NEOPLASM IS NOT COMPLETELY EXCLUDED.    Report edited to add contrast charge. 3-14-17 DI                        SIGNATURE PAGE ONLY  Exam Date: Mar 13, 2017 03:42:00 PM  Author: LILIANA PISANO  This report is corrected and signed

## 2017-03-14 NOTE — ED NOTES
Chest x-ray report noted, IMPRESSION:  1. SEVERE EMPHYSEMA WITH MULTIFOCAL PNEUMONIA, LIKELY CHRONIC WITHIN  THE UPPER LOBES.     2. SOMEWHAT NODULAR ALVEOLAR CONSOLIDATION SEEN WITHIN THE LOWER  LOBES. THE POSSIBILITY OF NEOPLASM IS NOT COMPLETELY EXCLUDED.  Pt admitted.

## 2017-03-15 NOTE — PLAN OF CARE
Problem: Goal Outcome Summary  Goal: Goal Outcome Summary  Outcome: No Change  BP trending low 90's/50's. Resp elevated 28 and 30. O2 at 1L, sats at 93%. Temp lower with 96.5. Disoriented to time. Pt stated that it was 1990's. Alert. Lungs clear and dim. Infrequent, weak, non-productive cough. ABD concaved. Red area to coccyx. BLE balwinder, dry, and flaky. IV infusing with 125 at D5/0.45NS/20K. Incontinent of urine. Turned and checked q2. Call light in reach. Bed alarm on.     Face to face report given with opportunity to observe patient.    Report given to Mariama Alcala   3/15/2017  7:07 AM

## 2017-03-15 NOTE — PLAN OF CARE
Problem: Patient Goal: Rt Focus  Goal: 1. Patient Goal: RT Focus  Pt will return to baseline respiratory status   Nebs given as ordered. Breath sounds dim and clear. SPO2 on 2L 91%

## 2017-03-15 NOTE — PLAN OF CARE
Problem: Patient Goal: Social Work Focus  Goal: 1. Patient Goal: Social Work Focus  Per Leonela RN case manager, Scott is agreeable to snf for str and would prefer to stay in [place].  Left a message [facility]

## 2017-03-15 NOTE — PLAN OF CARE
Problem: Goal Outcome Summary  Goal: Goal Outcome Summary  Outcome: No Change  Pt alert orientated.  Assist with supper- ate 3 bites of pizza and 1/2 of milk. Turn pt  Q2h. Pt keeps turning to left side.  Used urinal and incontinent.   IV infusing.   Oral cares done  And kathy area cleaned.      Problem: Pneumonia (Adult)  Goal: Signs and Symptoms of Listed Potential Problems Will be Absent or Manageable (Pneumonia)  Signs and symptoms of listed potential problems will be absent or manageable by discharge/transition of care (reference Pneumonia (Adult) CPG).   Outcome: No Change  Continue with antibiotics.  Temp.-99.9, 98.5.  Receiving nebs.

## 2017-03-15 NOTE — PLAN OF CARE
Problem: Patient Goal: Social Work Focus  Goal: 1. Patient Goal: Social Work Focus  Participated in Care Rounds and met with Scott. Had contacted Bipin the  in [place]. He states he does not have access to [place] , spoke with Scott and he does not know where it is either. Attempted to reach Vitaly brother Los, the phone just rings, there is no answering machine. We will need the  to proceed to get some services started for Scott. Will attempt to meet with brother Los when he visits.

## 2017-03-15 NOTE — PLAN OF CARE
"Problem: Goal Outcome Summary  Goal: Goal Outcome Summary  Outcome: No Change  Pt has been in chair most of day. Very quiet and cooperative. Appears sad or depressed- would not talk to nurse when questioned. States he's \"ok\". Appetite poor today- ate well yesterday. Meals called for by staff. Spine extremely bony- was red but now has very superficial shearing in 2 places. Thick aloe cream applied all over body to bony prominences which are almost every part of him. Has denied pain numerous times when asked. Feet soaked and cream applied, very dry with scaly heels. Will apply inflatable boots on when pt gets back to bed. Elbow protectors made out of slipper socks and applied. Scrubbed hair as much as pt would allow- areas of scabby black dirt removed. Beard, ears, hair and eyebrows trimmed. Brother had been here speaking with . Incontinent of urine- skin patent to buttocks. IVF infusing. Will apply foam dressings to bony areas to spine. Pt stable. Minimal SOB. Very weak cough.O2 at 1LNC       "

## 2017-03-15 NOTE — PLAN OF CARE
Face to face report given with opportunity to observe patient.  Report given to Zeny HAWK RN.    Mariama Underwood  3/15/2017, 3:27 PM

## 2017-03-15 NOTE — PLAN OF CARE
Problem: Patient Goal: Rt Focus  Goal: 1. Patient Goal: RT Focus  Pt will return to baseline respiratory status   Pt. Given 3 nebs this shift- BS- dim with crackles L base---SPO2 94% on 2lpm- Tolerated nebs well

## 2017-03-15 NOTE — PLAN OF CARE
Face to face report given with opportunity to observe patient.    Report given to VESTA Leslie   3/15/2017  11:48 AM

## 2017-03-15 NOTE — PLAN OF CARE
"Problem: Goal Outcome Summary  Goal: Goal Outcome Summary  Outcome: No Change  Patient alert and oriented x2 during this portion of the shift.  Confused as to what day it is.  Has denied pain throughout the shift.  Sat at the edge of the bed for about 20 minutes to eat bites of supper.  \"I'm done and then laid himself down.\"  Mepilex dressings remain intact along spine and to coccyx.  Patient turned, but really prefers to lay on his left side and requires much encouragement to turn off of this side.  Skin remains intact on his left hip.  IV infusing without incident.  No family to visit.  Is on an alternative mattress for skin issue, will change to a zone air bed later this evening.        "

## 2017-03-15 NOTE — PROGRESS NOTES
[facility]    Hospitalist Progress Note    Date of Service (when I saw the patient): 03/15/2017    Assessment & Plan   Scott Liang is a 81 year old male who was admitted on 3/13/2017.     1. Bilateral pneumonia: Patient came in to ER with extreme weakness. Chest CT & xray show bilateral infiltrates with air bronchogram. No real masses seen. No fevers, no elevated WBC. 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 Is on rocephin and azithromycin. Is on 1 liter of O2 Not overly symptomatic. Discussed with radiology and this looks more infectious with air bronchogram's no discrete masses that would be biopsy targets. Will check HIV status. Might require bronchoscopy eventually. Procalcitonin not exciting at 1.3.  Sent off studies for legionella blasto strep pneumo.  Will continue current abx and if doesn't resolve the will need bronchoscopy.     2. Severe malnutrition: Low albumin Weight is 79#. Extremely cachectic.  Is eating now  Has supplements and vitamins ordered.     3. Dehydration: Better with IVF. Bp stable.  Is starting to void more       4. Anemia: No bleeding here. Will follow send of some studies MCV looks normal if has stool send for guaiac        5. Cachexia: Unclear if is just malnutrition, or malignancy hiding some where. No palpable masses or nodes any where. Will undoubtedly need SNF for recovery after DC from here.          DVT Prophylaxis: Pneumatic Compression Devices  Code Status: Full Code    Disposition: Expected discharge in 2-3 days once sure stabilized.    Brayan Carroll    Interval History   Not much overnight  sat's remains stable  Some increased RR  No sputum production  He say got some sleep  Is eating more no stool  Was incontinent of urine  No fevers.  Seems more alert today.    -Data reviewed today: I reviewed all new labs and imaging results over the last  24 hours. I personally reviewed no images or EKG's today.    Physical Exam   Temp: 97.1  F (36.2  C) Temp src: Tympanic BP: 104/55   Heart Rate: 77 Resp: (!) 28 SpO2: (!) 91 % O2 Device: Nasal cannula Oxygen Delivery: 1 LPM  Vitals:    03/13/17 1805 03/14/17 0658 03/15/17 0705   Weight: 39.7 kg (87 lb 8.4 oz) 39.9 kg (87 lb 15.4 oz) 42.6 kg (93 lb 14.7 oz)     Vital Signs with Ranges  Temp:  [96  F (35.6  C)-99.9  F (37.7  C)] 97.1  F (36.2  C)  Heart Rate:  [] 77  Resp:  [28-36] 28  BP: ()/(40-55) 104/55  SpO2:  [88 %-96 %] 91 %  I/O last 3 completed shifts:  In: 3843 [P.O.:1060; I.V.:2783]  Out: 325 [Urine:325]    Peripheral IV 03/14/17 Left Lower forearm (Active)   Site Assessment WDL 3/15/2017  8:00 AM   Line Status Infusing 3/15/2017  8:00 AM   Phlebitis Scale 0-->no symptoms 3/15/2017  8:00 AM   Infiltration Scale 0 3/15/2017  8:00 AM   Number of days:1       Wound 03/15/17 Thoracic spine Shear injury superfial sheering (Active)   Site Assessment Pink 3/15/2017  8:02 AM   Size - Length x Width x Depth (cm) 2 areas #1- 1.5x1.5cm  and #2 1.5x1.3cm 3/15/2017  8:02 AM   Drainage Amount None 3/15/2017  8:02 AM   Wound Care/Cleansing Barrier applied  3/15/2017  8:02 AM   Dressing Moisture barrrier 3/15/2017  8:02 AM   Number of days:0     No line/device    Constitutional: Alert and oriented x2. No distress    HEENT: NC/AT, PERRL, EOMI, mouth moist, neck supple, no LN.     Cardiovascular: RRR. no Murmur, no  rubs, or gallops.  JVD flat.  Bruits no.  Pulses 2+    Respiratory:  Some areas of upper lobe consolidation  But generally no wheezes and clear  No obvious crackles.    Abdomen:  Cachectic  No masses  BS+Soft, NTND, no organomegaly.     Extremities: Warm/dry. No edema    Neuro:   Non focal, cranial nerves intact, Moves all extremities.    Medications     dextrose 5% and 0.45% NaCl + KCl 20 mEq/L 125 mL/hr at 03/15/17 0449       multivitamin, therapeutic with minerals  1 tablet Oral Daily     sodium  chloride (PF)  3 mL Intracatheter Q8H     heparin sodium PF  5,000 Units Subcutaneous Q12H     azithromycin  500 mg Intravenous Q24H     cefTRIAXone  1 g Intravenous Q24H     ipratropium - albuterol 0.5 mg/2.5 mg/3 mL  3 mL Nebulization 4x daily     pneumococcal vaccine  0.5 mL Intramuscular Prior to discharge     influenza Vac Split High-Dose  0.5 mL Intramuscular Prior to discharge       Data     Recent Labs  Lab 03/14/17  0520 03/13/17  1320   WBC 8.0 11.7*   HGB 8.2* 10.0*   MCV 89 89    324   INR 1.25*  --     142   POTASSIUM 4.0 5.1   CHLORIDE 106 100   CO2 33* 34*   BUN 25 34*   CR 0.51* 0.58*   ANIONGAP 5 8   SAI 8.2* 8.5   * 109*   ALBUMIN 1.5* 2.0*   PROTTOTAL 6.4* 8.0   BILITOTAL 0.1* 0.3   ALKPHOS 74 94   ALT 8 12   AST 13 16          No results found for this or any previous visit (from the past 24 hour(s)).

## 2017-03-15 NOTE — PLAN OF CARE
Face to face report given with opportunity to observe patient.    Report given to Yamilex Squires   3/14/2017  7:46 PM

## 2017-03-15 NOTE — PLAN OF CARE
Face to face report given with opportunity to observe patient.    Report given to Luisa Aguilar   3/14/2017  11:31 PM

## 2017-03-15 NOTE — PLAN OF CARE
Problem: Patient Goal: Social Work Focus  Goal: 1. Patient Goal: Social Work Focus  Spoke with Scott's brother Los, he does not know where his  is. Provided with the Medicare part B application and the Healthcare directive paperwork. Los shares that even though Scott's income is so little that he has been a saver all his live and has money including annuities. He states there is plenty of food in the house and that Scott just would not eat and did not want to see a doctor. Ana Rosa WINSTON is taking over this case and informed the [facility] that the family does not have the . Process will be stared to request this information and get Scott established with the [facility].

## 2017-03-16 NOTE — PLAN OF CARE
Problem: Goal Outcome Summary  Goal: Goal Outcome Summary  Outcome: No Change  's/50's.  SPO2 92% on 2 LPM NC.  Lungs coarse with some crackles on auscultation.  Pt is emaciated.  BS hypoactive x 4, concave abdomen.  Foam dressings to spine and coccyx CDI.  Blue heel boots intact to LLE and RLE.  Turned and repositioned q 2 hrs.  Pt needs lots of encouragement to take in PO.      Face to face report given with opportunity to observe patient.    Report given to VESTA Lloyd.    Elise Holly   3/16/2017  7:33 AM

## 2017-03-16 NOTE — PLAN OF CARE
"Problem: Goal Outcome Summary  Goal: Goal Outcome Summary  Outcome: Declining  Pt was noted to have low sats-low 70's at approx 2:30, as well as dusky color.. Dr Woodard aware. Pt now O2 6L with sats 89%. Rec'd new orders from MD to transfer pt to ICU. Pt now in ICU. Pts brother aware of transfer. States \"I will come tomorrow\". Pt aware of nurses conversation with his brother. Had been up in chair 2X's on 7-3 shift. Transferring with assist of 2. Pts brother was here this afternoon and noticed pt coughing more today than yesterday. No decline in skin integrity this shift. No falls or injuries this shift.     Face to face report given with opportunity to observe patient.    Report given to Bri Thornton   3/16/2017  4:10 PM    "

## 2017-03-16 NOTE — PLAN OF CARE
Face to face report given with opportunity to observe patient.    Report given to Elise Cedeno   3/15/2017  8:01 PM

## 2017-03-16 NOTE — PLAN OF CARE
Called by charge RN- States RT went to give pt neb. Pt had 2L O2 on and sats were 71%, Neb given. Dr Woodard aware of situation and reported to pts room.     1456 RT at bedside. Titrating O2. Currently on 6L with sat 89%    1520 Spoke with pts brother and updated on pt condition and plan to transfer pt to ICU. Brother states he will come back tomorrow.

## 2017-03-16 NOTE — PLAN OF CARE
Problem: Patient Goal: Rt Focus  Goal: 1. Patient Goal: RT Focus  Pt will return to baseline respiratory status   Outcome: Declining  2 neb tx this shift.  Spo2 71% on 2 lpm, notified nurse, MD.  O2 increased to 8 lpm, added neb tx.  BBS coarse, loose frequent cough.  O2 weaned to 8 lpm.

## 2017-03-16 NOTE — PLAN OF CARE
Problem: Patient Goal: Social Work Focus  Goal: 1. Patient Goal: Social Work Focus  Completed form to request  with Scott and faxed to FABBY Alvarez.  Also updated Beata at [facility] anticipate discharge early next week.  Scott aware of plan for rehab and is agreeable.

## 2017-03-16 NOTE — PLAN OF CARE
Skin assessed head to toe with pts permission during monthly skin rounds. Foam dressings covering bony prominences-UTV these areas, feet with blue pressure relief boots on & slipper socks on both elbows to prevent breakdowns.

## 2017-03-16 NOTE — PLAN OF CARE
Problem: Patient Goal: Rt Focus  Goal: 1. Patient Goal: RT Focus  Pt will return to baseline respiratory status   Nebs given as ordered. Breath sounds dim and clear. SpO2 on 2:92%

## 2017-03-16 NOTE — PLAN OF CARE
Problem: Patient Goal: Social Work Focus  Goal: 1. Patient Goal: Social Work Focus  Provided Larry with a power of  document. Contacted Dougie in health information for notarization.

## 2017-03-16 NOTE — PROGRESS NOTES
[facility]    Hospitalist Progress Note    Date of Service (when I saw the patient): 03/16/2017    Assessment & Plan   Scott Liang is a 81 year old male who was admitted on 3/13/2017.    1. Bilateral pneumonia: Patient came in to ER with extreme weakness. Chest CT & xray show bilateral infiltrates with air bronchogram. No real masses seen. No fevers, no elevated WBC. 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  Is on 1 liter of O2 Not overly symptomatic. Dr. Carroll discussed with radiology and this looks more infectious with air bronchogram's no discrete masses that would be biopsy targets. No distinct lymphadenopathy.  Will check HIV status. Might require bronchoscopy eventually. Procalcitonin not exciting at 1.3. Legionella antigen negative.  DDx including CAP versus malignancy versus blastomycosis versus histoplamosis versus pneumocystis?  - continuing ceftriaxone, azithromycin  - HIV pending  - fungal serologies pending  - ? Need for bronchoscopy  - wean O2      2. Severe malnutrition: Low albumin Weight is 79#. Extremely cachectic. Is eating now Has supplements and vitamins ordered.     3. Dehydration: Better with IVF. Bp stable. Is starting to void more       4. Anemia: No bleeding here.  Hgb trending down, 7.4 today  - transfuse for hgb < 7      5. Cachexia: Unclear if is just malnutrition, or malignancy hiding some where. No palpable masses or nodes any where. Will undoubtedly need SNF for recovery after DC from here.    DVT Prophylaxis: Pneumatic Compression Devices    Code Status: Full Code    Disposition: Expected discharge in 1-2 days.    Peripheral IV 03/14/17 Left Lower forearm (Active)   Site Assessment WDL 3/16/2017  7:41 AM   Line Status Infusing 3/16/2017  5:00 AM   Phlebitis Scale 0-->no symptoms 3/16/2017  5:00 AM   Infiltration Scale 0 3/16/2017  5:00 AM   Number  "of days:2       Wound 03/15/17 Thoracic spine Shear injury superfial sheering (Active)   Site Assessment UTV 3/15/2017  9:45 PM   Size - Length x Width x Depth (cm) 2 areas #1- 1.5x1.5cm  and #2 1.5x1.3cm 3/15/2017  8:02 AM   Drainage Amount None 3/15/2017  8:02 AM   Wound Care/Cleansing Barrier applied  3/15/2017  8:02 AM   Dressing Foam 3/15/2017  9:45 PM   Dressing Status Clean, dry, intact 3/15/2017  9:45 PM   Number of days:1     Line/device assessment(s) completed for medical necessity      Nick Woodard MD    Interval History   No new symptoms today.  No acute events overnight.  States he feels \"fine.\"  Flat affect.    -Data reviewed today: I reviewed all new labs and imaging results over the last 24 hours. I personally reviewed no images or EKG's today.    Physical Exam   Temp: 97.6  F (36.4  C) Temp src: Tympanic BP: 92/57   Heart Rate: 84 Resp: 22 SpO2: (!) 91 % O2 Device: Nasal cannula Oxygen Delivery: 2 LPM  Vitals:    03/14/17 0658 03/15/17 0705 03/16/17 0500   Weight: 39.9 kg (87 lb 15.4 oz) 42.6 kg (93 lb 14.7 oz) 43.1 kg (95 lb 0.3 oz)     Vital Signs with Ranges  Temp:  [97.6  F (36.4  C)-98.5  F (36.9  C)] 97.6  F (36.4  C)  Heart Rate:  [80-92] 84  Resp:  [18-26] 22  BP: ()/(48-67) 92/57  SpO2:  [91 %-96 %] 91 %    Intake/Output Summary (Last 24 hours) at 03/16/17 1002  Last data filed at 03/16/17 0927   Gross per 24 hour   Intake              440 ml   Output              800 ml   Net             -360 ml     Constitutional - AA, NAD, cachectic  HEENT - atraumatic, normocephalic  Neck - supple, no masses, no JVD  CVS - S1 S2 RRR, no murmurs, rubs, gallops  Respiratory - CTA b/l  GI - cachectic, soft, NT, ND, + bowel sounds, no organomegaly  Musculoskeletal - no LE edema, thin, cachectic  Neuro - oriented x 3, no gross focal deficits     Medications     dextrose 5% and 0.45% NaCl + KCl 20 mEq/L 75 mL/hr at 03/15/17 1343       multivitamin, therapeutic with minerals  1 tablet Oral Daily     " sodium chloride (PF)  3 mL Intracatheter Q8H     heparin sodium PF  5,000 Units Subcutaneous Q12H     azithromycin  500 mg Intravenous Q24H     cefTRIAXone  1 g Intravenous Q24H     ipratropium - albuterol 0.5 mg/2.5 mg/3 mL  3 mL Nebulization 4x daily     pneumococcal vaccine  0.5 mL Intramuscular Prior to discharge     influenza Vac Split High-Dose  0.5 mL Intramuscular Prior to discharge       Data     Recent Labs  Lab 03/16/17  0506 03/14/17  0520 03/13/17  1320   WBC 7.2 8.0 11.7*   HGB 7.4* 8.2* 10.0*   MCV 90 89 89    204 324   INR  --  1.25*  --     144 142   POTASSIUM 4.0 4.0 5.1   CHLORIDE 100 106 100   CO2 38* 33* 34*   BUN 12 25 34*   CR 0.47* 0.51* 0.58*   ANIONGAP 2* 5 8   SAI 8.0* 8.2* 8.5   GLC 89 119* 109*   ALBUMIN 1.3* 1.5* 2.0*   PROTTOTAL 5.7* 6.4* 8.0   BILITOTAL 0.1* 0.1* 0.3   ALKPHOS 60 74 94   ALT 11 8 12   AST 13 13 16          Recent Results (from the past 24 hour(s))   XR Chest Port 1 View    Narrative    CHEST    REPORT:  Widespread interstitial thickening is noted, with severe  apical confluent opacities and upward retraction of both sarah is seen.  There is retraction of the trachea from left-to-right.  Heart is  normal in size.    IMPRESSION:  WORSENING LEFT LUNG OPACITIES AS COMPARED TO MARCH 13, 2017.  NO CHANGE IN THE APPEARANCE OF THE RIGHT LUNG.  Exam Date: Mar 16, 2017 05:53:08 AM  Author: SUNNI KISER  This report is preliminary and transcribed         Nick Woodard MD

## 2017-03-17 NOTE — PROGRESS NOTES
[facility]    Hospitalist Progress Note    Date of Service (when I saw the patient): 03/17/2017    Assessment & Plan   Scott Liang is a 81 year old male who was admitted on 3/13/2017.    1. Bilateral pneumonia: Patient came in to ER with extreme weakness. Chest CT & xray show bilateral infiltrates with air bronchogram. No real masses seen. No fevers, no elevated WBC. 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  Is on 1 liter of O2 Not overly symptomatic. Dr. Carroll discussed with radiology and this looks more infectious with air bronchogram's no discrete masses that would be biopsy targets. No distinct lymphadenopathy. Might require bronchoscopy eventually. Procalcitonin low at 1.3. Legionella antigen negative.  HIV negative.  DDx including CAP versus malignancy versus blastomycosis versus histoplamosis versus pneumocystis versus TB?  - blasto, histo antigens pending  - ruling out for TB, negative pressure isolation for the time being  - continuing ceftriaxone, azithromycin  - ? need for bronchoscopy  - wean O2 as able      2. Severe malnutrition: Low albumin Weight is 79#. Extremely cachectic. Is eating very little.  Has supplements and vitamins ordered.     3. Dehydration: Better with IVF. Bp stable. Is starting to void more       4. Anemia: No bleeding here.  Hgb stable at 7.5 today  - transfuse for hgb < 7      5. Cachexia: Unclear if is just malnutrition, or malignancy hiding some where. No palpable masses or nodes any where. Will undoubtedly need SNF for recovery after DC from here.    DVT Prophylaxis: Pneumatic Compression Devices    Code Status: Full Code    Disposition: Expected discharge in 1-2 days.    Peripheral IV 03/16/17 Left Lower forearm (Active)   Site Assessment WDL 3/17/2017 12:00 AM   Line Status Infusing 3/17/2017 12:00 AM   Phlebitis Scale 0-->no symptoms  "3/17/2017 12:00 AM   Infiltration Scale 0 3/17/2017 12:00 AM   Extravasation? No 3/17/2017 12:00 AM   Number of days:1       Wound 03/15/17 Thoracic spine Shear injury superfial sheering (Active)   Site Assessment UTV 3/17/2017  4:00 AM   Size - Length x Width x Depth (cm) 2 areas #1- 1.5x1.5cm  and #2 1.5x1.3cm 3/15/2017  8:02 AM   Drainage Amount None 3/16/2017  4:00 PM   Wound Care/Cleansing Barrier applied  3/15/2017  8:02 AM   Dressing Foam 3/17/2017  4:00 AM   Dressing Status Clean, dry, intact 3/17/2017  4:00 AM   Number of days:2     Line/device assessment(s) completed for medical necessity      Nick Woodard MD    Interval History   Again, no new symptoms today.  Desaturation episode yesterday afternoon, now on 6L NC.  States he feels \"fine,\" no distress.  Flat affect.    -Data reviewed today: I reviewed all new labs and imaging results over the last 24 hours. I personally reviewed no images or EKG's today.    Physical Exam   Temp: 97.8  F (36.6  C) Temp src: Tympanic BP: 109/71   Heart Rate: 75 Resp: 20 SpO2: 94 % O2 Device: Nasal cannula Oxygen Delivery: 3 LPM  Vitals:    03/15/17 0705 03/16/17 0500 03/17/17 0645   Weight: 42.6 kg (93 lb 14.7 oz) 43.1 kg (95 lb 0.3 oz) 43.5 kg (95 lb 14.4 oz)     Vital Signs with Ranges  Temp:  [97.8  F (36.6  C)-99.6  F (37.6  C)] 97.8  F (36.6  C)  Heart Rate:  [] 75  Resp:  [14-59] 20  BP: ()/(53-98) 109/71  SpO2:  [71 %-100 %] 94 %      Intake/Output Summary (Last 24 hours) at 03/17/17 0927  Last data filed at 03/17/17 0200   Gross per 24 hour   Intake             2415 ml   Output              520 ml   Net             1895 ml     Constitutional - AA, NAD, cachectic  HEENT - atraumatic, normocephalic  Neck - supple, no masses, no JVD  CVS - S1 S2 RRR, no murmurs, rubs, gallops  Respiratory - crackles b/l R>L  GI - cachectic, soft, NT, ND, + bowel sounds, no organomegaly  Musculoskeletal - no LE edema, thin, cachectic  Neuro - oriented x 2, no gross focal " deficits     Medications     dextrose 5% and 0.45% NaCl + KCl 20 mEq/L 75 mL/hr at 03/17/17 0107       multivitamin, therapeutic with minerals  1 tablet Oral Daily     sodium chloride (PF)  3 mL Intracatheter Q8H     heparin sodium PF  5,000 Units Subcutaneous Q12H     azithromycin  500 mg Intravenous Q24H     cefTRIAXone  1 g Intravenous Q24H     ipratropium - albuterol 0.5 mg/2.5 mg/3 mL  3 mL Nebulization 4x daily     pneumococcal vaccine  0.5 mL Intramuscular Prior to discharge     influenza Vac Split High-Dose  0.5 mL Intramuscular Prior to discharge       Data     Recent Labs  Lab 03/17/17  0500 03/16/17  0506 03/14/17  0520 03/13/17  1320   WBC 10.5 7.2 8.0 11.7*   HGB 7.5* 7.4* 8.2* 10.0*   MCV 90 90 89 89    199 204 324   INR  --   --  1.25*  --    NA  --  140 144 142   POTASSIUM  --  4.0 4.0 5.1   CHLORIDE  --  100 106 100   CO2  --  38* 33* 34*   BUN  --  12 25 34*   CR  --  0.47* 0.51* 0.58*   ANIONGAP  --  2* 5 8   SAI  --  8.0* 8.2* 8.5   GLC  --  89 119* 109*   ALBUMIN  --  1.3* 1.5* 2.0*   PROTTOTAL  --  5.7* 6.4* 8.0   BILITOTAL  --  0.1* 0.1* 0.3   ALKPHOS  --  60 74 94   ALT  --  11 8 12   AST  --  13 13 16          No results found for this or any previous visit (from the past 24 hour(s)).    Nick Woodard MD

## 2017-03-17 NOTE — PLAN OF CARE
Problem: Goal Outcome Summary  Goal: Goal Outcome Summary  Outcome: No Change  SR 90's.  Pt encouraged to take sips of liquids during turn and reposition q 2 hrs.  Lungs crackles/coarse to RLL and  LLL.  SPO2 95% on 5 LPM NC.  Weak non productive cough.  Dressing to spine and coccyx CDI.  Blue heel boots to RLE and LLE.    Face to face report given with opportunity to observe patient.    Report given to VESTA Jacobs.    Elise Holly   3/17/2017  7:10 AM

## 2017-03-17 NOTE — PROGRESS NOTES
Follow up.  Pt is receiving regular diet with nutritional supplements.  Multivitamin with minerals is ordered.  Intake variable - 0-100%.      Pt is malnourished and very cachexic.  Measures are in place to prevent development of pressure sores.      Current nutritional interventions in place are appropriate.  Encourage pt to consume meals and supplements.

## 2017-03-17 NOTE — PROVIDER NOTIFICATION
Pt was being assisted by UA in eating dinner when pt appeared to aspirate on piece of baked cod. Pt coughing violently, cough too weak to produce clearance. Frequently coughing, unable to speak, when able to take breath and attempt to speak, voice is wet and gurgling. O2 sats 70s on 2L, increased to 6L and RT called for assistance. Dr. Souza also updated. States will come see patient.

## 2017-03-17 NOTE — PLAN OF CARE
Problem: Patient Goal: Rt Focus  Goal: 1. Patient Goal: RT Focus  Pt will return to baseline respiratory status   Outcome: No Change  Pt given nebs as ordered- on 3lpm 99%- BS- crackles in Left base

## 2017-03-17 NOTE — PLAN OF CARE
Face to face report given with opportunity to observe patient.    Report given to Elise Sanabria   3/16/2017  7:11 PM

## 2017-03-18 NOTE — PLAN OF CARE
Face to face report given with opportunity to observe patient.  Report given to Carmen malik RN.    Cortes Perez  3/17/2017, 11:23 PM

## 2017-03-18 NOTE — PLAN OF CARE
Problem: Patient Goal: Rt Focus  Goal: 1. Patient Goal: RT Focus  Pt will return to baseline respiratory status   Outcome: Improving  O2 weaned to 1L, nebs given as ordered.  Breath sounds diminished with crackels in bases.

## 2017-03-18 NOTE — PLAN OF CARE
Face to face report given with opportunity to observe patient.    Report given to rKistyn Zarate   3/18/2017  6:53 PM

## 2017-03-18 NOTE — PLAN OF CARE
Face to face report given with opportunity to observe patient.    Report given to Kristyn Zarate   3/18/2017  6:54 PM

## 2017-03-18 NOTE — PROVIDER NOTIFICATION
Dr. Souza in to see patient. O2 sats 88-92% on 6L of O2. Pt continues to cough but less frequently. Pt now able to speak with out difficulty. Denies SOB. BP 80s/40s. Updated MD. MD states will continue to monitor respiratory status, pt appears stable at this time.

## 2017-03-18 NOTE — PROGRESS NOTES
[facility]    Hospitalist Progress Note    Date of Service (when I saw the patient): 03/18/2017    Assessment & Plan   Scott Liang is a 81 year old male who was admitted on 3/13/2017.    1. Bilateral pneumonia: Patient came in to ER with extreme weakness. Chest CT & xray show bilateral infiltrates with air bronchogram. No real masses seen. No fevers, no elevated WBC. 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  Unable to get any sputum for analysis. Not overly symptomatic.  No distinct lymphadenopathy. Might require bronchoscopy eventually. Procalcitonin low at 1.3. Legionella antigen negative.  HIV negative.  DDx including CAP versus malignancy versus blastomycosis versus histoplamosis versus pneumocystis?  - blasto, histo antigens pending  - MTB PCR negative  - continuing ceftriaxone, azithromycin  - CT guided biopsy obtained 3/17/17, awaiting pathology and cultures  - wean O2 as able      2. Severe malnutrition: Low albumin Weight is 79#. Extremely cachectic. Is eating very little.  Has supplements and vitamins ordered.     3. Dehydration: Better with IVF. Bp stable. Is starting to void more       4. Anemia: No bleeding here.  Hgb stable at 7.5 today  - transfuse for hgb < 7      5. Cachexia: Unclear if is just malnutrition, or malignancy hiding some where. No palpable masses or nodes any where. Will undoubtedly need SNF for recovery after DC from here.    DVT Prophylaxis: Pneumatic Compression Devices    Code Status: Full Code    Disposition: Expected discharge in 1-2 days.    Peripheral IV 03/17/17 Left Lower forearm (Active)   Site Assessment WDL 3/18/2017  8:20 AM   Line Status Infusing 3/18/2017  8:20 AM   Phlebitis Scale 0-->no symptoms 3/18/2017  8:20 AM   Infiltration Scale 0 3/18/2017  8:20 AM   Number of days:1       Wound 03/15/17 Thoracic spine Shear injury superfial sheering (Active)   Site Assessment UTV  3/18/2017  8:00 AM   Anh-wound Assessment UTV 3/18/2017  8:00 AM   Size - Length x Width x Depth (cm) 2 areas #1- 1.5x1.5cm  and #2 1.5x1.3cm 3/15/2017  8:02 AM   Drainage Amount None 3/18/2017  8:00 AM   Wound Care/Cleansing Barrier applied  3/18/2017  8:00 AM   Dressing Foam 3/18/2017  8:00 AM   Dressing Status Clean, dry, intact 3/18/2017  8:00 AM   Number of days:3     Line/device assessment(s) completed for medical necessity      Nick Woodard MD    Interval History   Again, no new symptoms today.  Brighter today, but still weak.    -Data reviewed today: I reviewed all new labs and imaging results over the last 24 hours. I personally reviewed no images or EKG's today.    Physical Exam   Temp: 96.3  F (35.7  C) Temp src: Tympanic BP: (!) 93/44   Heart Rate: 85 Resp: 24 SpO2: 93 % O2 Device: Nasal cannula Oxygen Delivery: 2 LPM  Vitals:    03/16/17 0500 03/17/17 0645 03/18/17 0720   Weight: 43.1 kg (95 lb 0.3 oz) 43.5 kg (95 lb 14.4 oz) 43.4 kg (95 lb 10.9 oz)     Vital Signs with Ranges  Temp:  [96.3  F (35.7  C)-98.3  F (36.8  C)] 96.3  F (35.7  C)  Heart Rate:  [78-95] 85  Resp:  [18-52] 24  BP: ()/(44-84) 93/44  SpO2:  [74 %-100 %] 93 %      Intake/Output Summary (Last 24 hours) at 03/18/17 0920  Last data filed at 03/18/17 0531   Gross per 24 hour   Intake             2867 ml   Output              470 ml   Net             2397 ml     Constitutional - AA, NAD, cachectic  HEENT - atraumatic, normocephalic  Neck - supple, no masses, no JVD  CVS - S1 S2 RRR, no murmurs, rubs, gallops  Respiratory - crackles b/l R>L  GI - cachectic, soft, NT, ND, + bowel sounds, no organomegaly  Musculoskeletal - no LE edema, thin, cachectic  Neuro - oriented x 2, no gross focal deficits     Medications     dextrose 5% and 0.45% NaCl + KCl 20 mEq/L 75 mL/hr at 03/17/17 1348       sodium chloride (PF)  3 mL Intracatheter Q8H     multivitamin, therapeutic with minerals  1 tablet Oral Daily     sodium chloride (PF)  3 mL  Intracatheter Q8H     heparin sodium PF  5,000 Units Subcutaneous Q12H     azithromycin  500 mg Intravenous Q24H     cefTRIAXone  1 g Intravenous Q24H     ipratropium - albuterol 0.5 mg/2.5 mg/3 mL  3 mL Nebulization 4x daily     pneumococcal vaccine  0.5 mL Intramuscular Prior to discharge     influenza Vac Split High-Dose  0.5 mL Intramuscular Prior to discharge       Data     Recent Labs  Lab 03/18/17  0537 03/17/17  1615 03/17/17  0500 03/16/17  0506 03/14/17  0520 03/13/17  1320   WBC 9.2  --  10.5 7.2 8.0 11.7*   HGB 7.5*  --  7.5* 7.4* 8.2* 10.0*   MCV 90  --  90 90 89 89     --  210 199 204 324   INR  --  1.30*  --   --  1.25*  --    NA  --   --   --  140 144 142   POTASSIUM  --   --   --  4.0 4.0 5.1   CHLORIDE  --   --   --  100 106 100   CO2  --   --   --  38* 33* 34*   BUN  --   --   --  12 25 34*   CR  --   --   --  0.47* 0.51* 0.58*   ANIONGAP  --   --   --  2* 5 8   SAI  --   --   --  8.0* 8.2* 8.5   GLC  --   --   --  89 119* 109*   ALBUMIN  --   --   --  1.3* 1.5* 2.0*   PROTTOTAL  --   --   --  5.7* 6.4* 8.0   BILITOTAL  --   --   --  0.1* 0.1* 0.3   ALKPHOS  --   --   --  60 74 94   ALT  --   --   --  11 8 12   AST  --   --   --  13 13 16          Recent Results (from the past 24 hour(s))   XR Post Procedure Imaging    Narrative    POST-PROCEDURE CHEST IMAGING    REPORT:  There is abnormal increased density in both lungs, stable  from previous examination on March 16, 2017.  No pneumothorax is seen  following lung biopsy.  Exam Date: Mar 17, 2017 03:36:00 PM  Author: SUNNI KISER  This report is preliminary and transcribed         Nick Woodard MD

## 2017-03-18 NOTE — PLAN OF CARE
Face to face report given with opportunity to observe patient.    Report given to VESTA Thorpe   3/18/2017  7:05 AM

## 2017-03-18 NOTE — PLAN OF CARE
Problem: Goal Outcome Summary  Goal: Goal Outcome Summary  Outcome: No Change  Remained NPO all day today. Oral meds held. Up in chair for a while today. Denies pain. Has not been coughing. BBS with crackles in bases bilaterally. Heal protectors off for a while now this evening. IV patent . Offers no complaints.

## 2017-03-18 NOTE — PLAN OF CARE
"Problem: Goal Outcome Summary  Goal: Goal Outcome Summary  Outcome: Declining  Pt remains alert and oriented, quiet through out day, needs encouragement to voice needs. Up in chair this AM with assist of 2, transferred well. Good appetite for breakfast, ate 2 scrambled eggs and toast, pt able to feed himself independently with out difficulties. Skin remains unchanged. Foam dressings to spine and coccyx. Pt voiding appropriately and adequate amounts into urinal.      Pt remains afebrile. HR 80s, sinus rhythm with occasional PVCs/PACs. RR 30s, non labored, pt denies SOB, able to wean pt to 2L of oxygen through out day, O2 sats 90s. Lung sounds have crackles through out. Frequent weak cough, able to produce sputum only once this shift. Sputum sample sent to lab. Rule out TB. Removed from precautions.      Pt down for CT guided biopsy from 1440 - 1555. Pt tolerated well. Puncture site covered with bandaid. Appears WDL. Lung sounds remain unchanged, crackles through out. O2 sats 98% on 2L.      At 1730 pt appeared to aspirate on dinner while being assisted in feeding. Pt informed writer that he attempted to tell UA that he could feed himself when he began to choke. O2 sats declined to 70s, requiring increase to 6L to maintain sats 90s. Pt coughing frequently, cough very weak, unable to clear airway adequately on his own, speech very wet/gurling. MD notified. RN remained with patient, encouraged coughing and deep breathing. At approximately 1830, MD in to assess patient, pt able to speak and coughing less. Lung sounds remain unchanged, crackles through out, O2 sats improved 96% on 6L. Pt reports \"feeling much better.\" No new orders obtained from MD.            Face to face report given with opportunity to observe patient.     Report given to Lyle U., RN Annelyse J. Stromberg   3/17/2017  7:15 PM             "

## 2017-03-18 NOTE — PLAN OF CARE
Problem: Patient Goal: Rt Focus  Goal: 1. Patient Goal: RT Focus  Pt will return to baseline respiratory status   Weaned back to 4 lpm 97%, had episode of coughing, sob earlier.  Resting well now, no coughing. One neb tx this shift.

## 2017-03-18 NOTE — PROVIDER NOTIFICATION
Notified dr. Souza of patient having respirations 20-40's, o2 sats mid 90's on 6 LPM nc, and having lower BP's, no new orders will continue to monitor.

## 2017-03-18 NOTE — PLAN OF CARE
"Problem: Goal Outcome Summary  Goal: Goal Outcome Summary  Outcome: No Change  BP 99/53 (BP Location: Left arm)  Pulse 86  Temp 97.5  F (36.4  C) (Tympanic)  Resp (!) 28  Ht 1.778 m (5' 10\")  Wt 43.5 kg (95 lb 14.4 oz)  SpO2 97%  BMI 13.76 kg/m2     Pt alert and oriented upon initial assessment. Slow to respond. Denies pain. Crackles noted throughout lungs. Reduced to 4 LPM by RN, satting high 90s on 4. Denies SOB. Infrequent weak cough, nonproductive. Tele report per ICU nurse, SR 80-90s w/ occasional PVCs. Biopsy site unchanged, covered with bandaid, WDL. Foam dressings to spine and coccyx CDI. Pillows and boots in use. No edema noted on BLE. Skin is ashy in color. T/R q2h. IVF infusing at 75/hr. Remains NPO. Sleeping on all hourly checks. No needs verbalized. No falls or injuries this shift. Will continue to monitor.     Problem: Pneumonia (Adult)  Goal: Signs and Symptoms of Listed Potential Problems Will be Absent or Manageable (Pneumonia)  Signs and symptoms of listed potential problems will be absent or manageable by discharge/transition of care (reference Pneumonia (Adult) CPG).   Outcome: No Change    03/18/17 0207   Pneumonia   Problems Assessed (Pneumonia) all   Problems Present (Pneumonia) respiratory compromise           "

## 2017-03-19 NOTE — PLAN OF CARE
Face to face report given with opportunity to observe patient.    Report given to Ila Hernandez   3/19/2017  7:19 AM

## 2017-03-19 NOTE — PLAN OF CARE
Problem: Patient Goal: Rt Focus  Goal: 1. Patient Goal: RT Focus  Pt will return to baseline respiratory status   Outcome: No Change  Patient on 2L O2, nebs given as ordered.  Breath sounds diminished with crackels in bases.

## 2017-03-19 NOTE — PLAN OF CARE
Face to face report given with opportunity to observe patient.    Report given to Jmimy Zarate   3/19/2017  6:56 PM

## 2017-03-19 NOTE — PLAN OF CARE
Problem: Goal Outcome Summary  Goal: Goal Outcome Summary  Outcome: No Change  Remains NPO today. Swallow eval not able to be done on the weekend. IV remains patent. Has been up in the chair a couple of times today. VSS. Crackles in bases bilaterally posterior. Denies pain. Voids in spec cup and spills sometimes on gown and linens. Gown and linens changed .

## 2017-03-19 NOTE — PROGRESS NOTES
[facility]    Hospitalist Progress Note    Date of Service (when I saw the patient): 03/19/2017    Assessment & Plan   Scott Liang is a 81 year old male who was admitted on 3/13/2017.    1. Bilateral pneumonia: Patient came in to ER with extreme weakness. Chest CT & xray show bilateral infiltrates with air bronchogram. No real masses seen. No fevers, no elevated WBC. 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  Unable to get any sputum for analysis. Not overly symptomatic.  No distinct lymphadenopathy. Might require bronchoscopy eventually. Procalcitonin low at 1.3. Legionella antigen negative.  HIV negative.  DDx including CAP versus malignancy versus fungal?  - blasto, histo antigens pending  - MTB PCR negative  - continuing ceftriaxone, azithromycin  - CT guided biopsy obtained 3/17/17, awaiting pathology and cultures  - wean O2 as able      2. Severe malnutrition: Low albumin Weight is 79#. Extremely cachectic. Is eating very little.  Has supplements and vitamins ordered.     3. Dehydration: Better with IVF. Bp stable. Is starting to void more       4. Anemia: No bleeding here.  Hgb 9.4, ? Jump?  Likely spurious reading  - transfuse for hgb < 7      5. Cachexia: Unclear if is just malnutrition, or malignancy hiding some where. No palpable masses or nodes any where. Will undoubtedly need SNF for recovery after DC from here.    DVT Prophylaxis: Pneumatic Compression Devices    Code Status: Full Code    Disposition: Expected discharge in 1-2 days once biopsy results return, will need SNF    Peripheral IV 03/17/17 Left Lower forearm (Active)   Site Assessment WDL 3/19/2017  9:00 AM   Line Status Infusing 3/19/2017  9:00 AM   Phlebitis Scale 0-->no symptoms 3/19/2017  9:00 AM   Infiltration Scale 0 3/19/2017  9:00 AM   Number of days:2       Wound 03/15/17 Thoracic spine Shear injury superfial sheering (Active)   Site  Assessment UTV 3/19/2017  9:00 AM   Anh-wound Assessment UTV 3/19/2017  9:00 AM   Size - Length x Width x Depth (cm) 2 areas #1- 1.5x1.5cm  and #2 1.5x1.3cm 3/15/2017  8:02 AM   Drainage Amount None 3/19/2017  9:00 AM   Wound Care/Cleansing Barrier applied  3/18/2017  9:35 PM   Dressing Foam 3/19/2017  9:00 AM   Dressing Status Clean, dry, intact 3/19/2017  9:00 AM   Number of days:4     Line/device assessment(s) completed for medical necessity      Nick Woodard MD    Interval History   Again, no new symptoms today.  No acute events overnight.  Weak.    -Data reviewed today: I reviewed all new labs and imaging results over the last 24 hours. I personally reviewed no images or EKG's today.    Physical Exam   Temp: 96.7  F (35.9  C) Temp src: Tympanic BP: 115/57   Heart Rate: 88 Resp: 20 SpO2: (!) 80 % O2 Device: None (Room air) Oxygen Delivery: 2 LPM  Vitals:    03/17/17 0645 03/18/17 0720 03/19/17 0636   Weight: 43.5 kg (95 lb 14.4 oz) 43.4 kg (95 lb 10.9 oz) 43.1 kg (95 lb 0.3 oz)     Vital Signs with Ranges  Temp:  [96.7  F (35.9  C)-98.7  F (37.1  C)] 96.7  F (35.9  C)  Heart Rate:  [88-90] 88  Resp:  [18-20] 20  BP: ()/(45-57) 115/57  SpO2:  [80 %-94 %] 80 %      Intake/Output Summary (Last 24 hours) at 03/19/17 1223  Last data filed at 03/19/17 1155   Gross per 24 hour   Intake              763 ml   Output              710 ml   Net               53 ml   Constitutional - AA, NAD, cachectic  HEENT - atraumatic, normocephalic  Neck - supple, no masses, no JVD  CVS - S1 S2 RRR, no murmurs, rubs, gallops  Respiratory - crackles b/l R>L  GI - cachectic, soft, NT, ND, + bowel sounds, no organomegaly  Musculoskeletal - no LE edema, thin, cachectic  Neuro - oriented x 2, no gross focal deficits     Medications     dextrose 5% and 0.45% NaCl + KCl 20 mEq/L 75 mL/hr at 03/18/17 1823       sodium chloride (PF)  3 mL Intracatheter Q8H     multivitamin, therapeutic with minerals  1 tablet Oral Daily     sodium  chloride (PF)  3 mL Intracatheter Q8H     heparin sodium PF  5,000 Units Subcutaneous Q12H     azithromycin  500 mg Intravenous Q24H     cefTRIAXone  1 g Intravenous Q24H     ipratropium - albuterol 0.5 mg/2.5 mg/3 mL  3 mL Nebulization 4x daily     pneumococcal vaccine  0.5 mL Intramuscular Prior to discharge     influenza Vac Split High-Dose  0.5 mL Intramuscular Prior to discharge       Data     Recent Labs  Lab 03/19/17  0532 03/18/17  0537 03/17/17  1615 03/17/17  0500 03/16/17  0506 03/14/17  0520 03/13/17  1320   WBC 6.3 9.2  --  10.5 7.2 8.0 11.7*   HGB 9.4* 7.5*  --  7.5* 7.4* 8.2* 10.0*   MCV 89 90  --  90 90 89 89    232  --  210 199 204 324   INR  --   --  1.30*  --   --  1.25*  --    NA  --   --   --   --  140 144 142   POTASSIUM  --   --   --   --  4.0 4.0 5.1   CHLORIDE  --   --   --   --  100 106 100   CO2  --   --   --   --  38* 33* 34*   BUN  --   --   --   --  12 25 34*   CR  --   --   --   --  0.47* 0.51* 0.58*   ANIONGAP  --   --   --   --  2* 5 8   SAI  --   --   --   --  8.0* 8.2* 8.5   GLC  --   --   --   --  89 119* 109*   ALBUMIN  --   --   --   --  1.3* 1.5* 2.0*   PROTTOTAL  --   --   --   --  5.7* 6.4* 8.0   BILITOTAL  --   --   --   --  0.1* 0.1* 0.3   ALKPHOS  --   --   --   --  60 74 94   ALT  --   --   --   --  11 8 12   AST  --   --   --   --  13 13 16          No results found for this or any previous visit (from the past 24 hour(s)).    Nick Woodard MD

## 2017-03-19 NOTE — PLAN OF CARE
Problem: Goal Outcome Summary  Goal: Goal Outcome Summary  Outcome: No Change  Assessment and vital signs as charted. Pt alert and oriented. Denies pain. Sats 86-88% on 1L. O2 increased to 3L. Lung sounds coarse and diminished throughout. Pt repositioned every 2 hours. Settled in for sleep. Will monitor.

## 2017-03-20 NOTE — PLAN OF CARE
Problem: Patient Goal: Rt Focus  Goal: 1. Patient Goal: RT Focus  Pt will return to baseline respiratory status   Outcome: No Change  Spo2 94% on NC  2 lpm.  Neb tx as ordered. BBS dim, occ crax.  Weak NPC.

## 2017-03-20 NOTE — PLAN OF CARE
Problem: Goal Outcome Summary  Goal: Goal Outcome Summary  Outcome: No Change  Remains on 1lpm NC. Lung sounded clear but dim. Crackles heard occasionally at bases.incontinent and/or missing his cup to void into. Encouraging to use urinal but refuses. Doesn't call for incontinent episodes.  Turns q 1-2 hrs. Spine area covered in foam, elbows protected, heel boots on. Left posterior hip area has a large area that is barely blanchable. Trying to offload area as much as possible but pt moves himself back to left side. Heavily prefers left side.   Mentation appears wdl-but does not follow directions and does appear disconnected with conversations sometimes.   Awaiting lung biopsy pathology from 3/17  Awaiting swallow eval due today-remains NPO until then.   adeline Castillo, [id number]       Problem: Skin Integrity Impairment, Risk/Actual (Adult)  Goal: Identify Related Risk Factors and Signs and Symptoms  Related risk factors and signs and symptoms are identified upon initiation of Human Response Clinical Practice Guideline (CPG)   Outcome: No Change    03/20/17 0457   Skin Integrity Impairment, Risk/Actual   Skin Integrity Impairment, Risk/Actual: Related Risk Factors age extremes;tissue perfusion impaired;immobility   Signs and Symptoms (Skin Integrity Impairment) erythema nonblanchable                                                                  Goal: Skin Integrity/Wound Healing  Patient will demonstrate the desired outcomes by discharge/transition of care.   Outcome: No Change    03/20/17 0457   Skin Integrity Impairment, Risk/Actual (Adult)   Skin Integrity/Wound Healing unable to achieve outcome

## 2017-03-20 NOTE — PLAN OF CARE
Face to face report given with opportunity to observe patient.    Report given to Wilma Hernandez   3/19/2017  11:38 PM

## 2017-03-20 NOTE — PLAN OF CARE
Problem: Goal Outcome Summary  Goal: Goal Outcome Summary  Outcome: No Change  COMMUNICATION: Patient able to communicate but is a poor historian.   DIET: NPO, oral trials with SLP only.   STRATEGIES FOR SAFE SWALLOW: Aspiration precautions and good oral cares.   COGNITION: Deficits noted.   COMMENTS: SLP will continue to follow. Limited improvement expected.

## 2017-03-20 NOTE — PLAN OF CARE
Problem: Goal Outcome Summary  Goal: Goal Outcome Summary  Outcome: Declining  Pt lethargic all day. Disoriented to time and situation. Refused to get out of bed. VSS. Afebrile. HR 80s. BP 100s/50s. RR 20s, non labored, lung sounds coarse bilaterally with fine crackles to bases. Requiring 2L of O2 to maintain sats >90%. Frequent cough this AM after swallow eval. Cleared this afternoon. Pt failed swallow eval per speech therapist. Remains NPO. Repositioned often. Blanchable redness to bilateral hips, barrier cream applied, improves with repositioning. Foam dressing applied to left hip for protection. Spine and coccyx covered with foam dressings, remain clean, dry and intact. Pt voiding frequently, continent.

## 2017-03-20 NOTE — PROGRESS NOTES
03/20/17 0900   General Information   Onset Date 03/13/17   Start of Care Date 03/20/17   Referring Physician Dr. Woodard    Patient/Family Goals Statement Patient unable to verbalize a goal but reports no swallowing problems-per RN patient has not been eating and has been choking when he eats.    Swallowing Evaluation Bedside swallow evaluation   Behaviorial Observations Confabulatory;Confused;Distractible;Impulsive;Inappropriate;Lethargic   Mode of current nutrition NPO   Respiratory Status O2 Supply   Type of O2 supply Nasal cannula   Clinical Swallow Evaluation   Oral Musculature generally intact   Structural Abnormalities none present   Dentition present and adequate   Secretion Management problems swallowing secretions   Mucosal Quality good   Mandibular Strength and Mobility intact   Oral Labial Strength and Mobility WFL   Lingual Strength and Mobility WFL   Velar Elevation intact   Buccal Strength and Mobility intact   Laryngeal Function Cough;Throat clear;Swallow   Additional Documentation Yes   Swallow Eval   Feeding Assistance minimal assistance required   Clinical Swallow Eval: Nectar Thick Liquid Texture Trial   Mode of Presentation, Nectar spoon;fed by clinician   Volume of Nectar Presented 2 oz   Oral Phase, [place] Poor AP movement   Pharyngeal Phase, Nectar coughing/choking;no awareness of problems   Clinical Swallow Eval: Puree Solid Texture Trial   Mode of Presentation, Puree self-fed;spoon   Volume of Puree Presented 2 oz   Oral Phase, Puree WFL   Pharyngeal Phase, Puree throat clearing   Clinical Swallow Eval: Solid Food Texture Trial   Mode of Presentation, Solid self-fed   Volume of Solid Food Presented 1 goldfish cracker    Oral Phase, Solid Poor AP movement   Pharyngeal Phase, Solid coughing/choking   Diagnostic Statement Patient coughed hard on this consistency and needed cues to cough and breath.    Swallow Compensations   Swallow Compensations Pacing;Reduce amounts;Multiple swallow    Results Aspiration   General Therapy Interventions   Planned Therapy Interventions Dysphagia Treatment   Dysphagia treatment Oropharyngeal exercise training;Modified diet education;Instruction of safe swallow strategies;Compensatory strategies for swallowing   Swallow Eval: Clinical Impressions   Skilled Criteria for Therapy Intervention Skilled criteria met.  Treatment indicated.   Dysphagia Outcome Severity Scale (CARMELITA) Level 1 - CARMELITA   Treatment Diagnosis Severe dysphagia    Diet texture recommendations NPO   Recommended Feeding/Eating Techniques alternate between small bites and sips of food/liquid;check mouth frequently for oral residue/pocketing;hard swallow w/ each bite or sip;maintain upright posture during/after eating for 30 mins;no straws;small sips/bites  (only during SLP trials )   Demonstrates Need for Referral to Another Service dietitian   Therapy Frequency 5 times/wk   Predicted Duration of Therapy Intervention (days/wks) 2 weeks   Anticipated Discharge Disposition extended care facility   Risks and Benefits of Treatment have been explained. Yes   Patient, family and/or staff in agreement with Plan of Care Yes   Clinical Impression Comments SLP recommends NPO. Unsure if patient has potential to make improvements with swallowing.    Total Evaluation Time   Total Evaluation Time (Minutes) 30

## 2017-03-20 NOTE — PLAN OF CARE
Face to face report given with opportunity to observe patient.    Report given to Kadie Andersen   3/20/2017  7:28 AM

## 2017-03-20 NOTE — PROGRESS NOTES
Follow up.  Pt is now NPO per speech rx evaluation r/t aspiration risk.      Intake very poor prior to being made NPO.      If unable to advance diet, decision needs to be made regarding enteral feedings vs comfort type measures since pt is malnourished/cachexic.      RD will follow medical plan.

## 2017-03-20 NOTE — PROGRESS NOTES
[facility]    Hospitalist Progress Note    Date of Service (when I saw the patient): 03/20/2017    Assessment & Plan   Scott Liang is a 81 year old male who was admitted on 3/13/2017.    1. Bilateral pneumonia: Patient came in to ER with extreme weakness. Chest CT & xray show bilateral infiltrates with air bronchogram. No real masses seen. No fevers, no elevated WBC. 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  Unable to get any sputum for analysis. Not overly symptomatic.  No distinct lymphadenopathy. Might require bronchoscopy eventually. Procalcitonin low at 1.3. Legionella antigen negative.  HIV negative. Blasto Leticia/histo Ag negative. Sputum MTB/RIF not detected. DDx including CAP versus inflammatory versus malignancy versus fungal?  - continuing ceftriaxone, azithromycin  - CT guided biopsy obtained 3/17/17, awaiting pathology, cytology and cultures. If no yield, may need to consider bronchoscopy. Might also consider abdominal imaging to further rule out malignancy source  - wean O2 as able      2. Severe malnutrition: Low albumin Weight is 79#. Extremely cachectic. Is eating very little.  Has supplements and vitamins ordered.     3. Dehydration: Better with IVF. Bp stable. Is starting to void more       4. Normocytic anemia: No bleeding here. Hgb has ranged 7.4-10. Currently 8.2. Suspect anemia of chronic disease. Consider further iron studies    5. Cachexia: Unclear if is just malnutrition, or malignancy hiding some where. No palpable masses or nodes any where. Will undoubtedly need SNF for recovery after DC from here.    DVT Prophylaxis: Pneumatic Compression Devices    Code Status: Full Code    Disposition: Expected discharge in 1-2 days once biopsy results return, will need SNF    Peripheral IV 03/17/17 Left Lower forearm (Active)   Site Assessment WDL 3/20/2017  8:00 AM   Line Status Infusing;Checked every 1-2  hour 3/20/2017  8:00 AM   Phlebitis Scale 0-->no symptoms 3/20/2017  8:00 AM   Infiltration Scale 0 3/20/2017  8:00 AM   Number of days:3       Wound 03/15/17 Thoracic spine Shear injury superfial sheering (Active)   Site Assessment UTV 3/20/2017  8:02 AM   Anh-wound Assessment UT 3/20/2017  8:02 AM   Size - Length x Width x Depth (cm) 2 areas #1- 1.5x1.5cm  and #2 1.5x1.3cm 3/15/2017  8:02 AM   Drainage Amount None 3/20/2017  8:02 AM   Wound Care/Cleansing Barrier applied  3/18/2017  9:35 PM   Dressing Foam 3/20/2017  8:02 AM   Dressing Status Clean, dry, intact 3/20/2017  8:02 AM   Number of days:5     Line/device assessment(s) completed for medical necessity      Everardo Weller    Interval History   No complaints. Overall generalized weakness. No other interval complaints    -Data reviewed today: I reviewed all new labs and imaging results over the last 24 hours. I personally reviewed no images or EKG's today.    Physical Exam   Temp: 98.5  F (36.9  C) Temp src: Oral BP: 109/52 Pulse: 80 Heart Rate: 89 Resp: 22 SpO2: 94 % O2 Device: Nasal cannula Oxygen Delivery: 2 LPM  Vitals:    03/18/17 0720 03/19/17 0636 03/20/17 0600   Weight: 43.4 kg (95 lb 10.9 oz) 43.1 kg (95 lb 0.3 oz) 42 kg (92 lb 9.5 oz)     Vital Signs with Ranges  Temp:  [96.9  F (36.1  C)-98.5  F (36.9  C)] 98.5  F (36.9  C)  Pulse:  [80] 80  Heart Rate:  [80-89] 89  Resp:  [18-26] 22  BP: (105-114)/(51-63) 109/52  SpO2:  [88 %-97 %] 94 %      Intake/Output Summary (Last 24 hours) at 03/19/17 1223  Last data filed at 03/19/17 1155   Gross per 24 hour   Intake              763 ml   Output              710 ml   Net               53 ml   Constitutional - AA, NAD, cachectic  HEENT - atraumatic, normocephalic  Neck - supple, no masses, no JVD  CVS - S1 S2 RRR, no murmurs, rubs, gallops  Respiratory - crackles b/l R>L  GI - cachectic, soft, NT, ND, + bowel sounds, no organomegaly  Musculoskeletal - no LE edema, thin, cachectic  Neuro - oriented x 2, no  gross focal deficits     Medications     dextrose 5% and 0.45% NaCl + KCl 20 mEq/L 75 mL/hr at 03/20/17 1303       multivitamin, therapeutic with minerals  1 tablet Oral Daily     sodium chloride (PF)  3 mL Intracatheter Q8H     heparin sodium PF  5,000 Units Subcutaneous Q12H     azithromycin  500 mg Intravenous Q24H     cefTRIAXone  1 g Intravenous Q24H     ipratropium - albuterol 0.5 mg/2.5 mg/3 mL  3 mL Nebulization 4x daily     pneumococcal vaccine  0.5 mL Intramuscular Prior to discharge     influenza Vac Split High-Dose  0.5 mL Intramuscular Prior to discharge       Data     Recent Labs  Lab 03/20/17  0545 03/19/17  0532 03/18/17  0537 03/17/17  1615  03/16/17  0506 03/14/17  0520   WBC 6.5 6.3 9.2  --   < > 7.2 8.0   HGB 8.2* 9.4* 7.5*  --   < > 7.4* 8.2*   MCV 87 89 90  --   < > 90 89    236 232  --   < > 199 204   INR  --   --   --  1.30*  --   --  1.25*   NA  --   --   --   --   --  140 144   POTASSIUM  --   --   --   --   --  4.0 4.0   CHLORIDE  --   --   --   --   --  100 106   CO2  --   --   --   --   --  38* 33*   BUN  --   --   --   --   --  12 25   CR  --   --   --   --   --  0.47* 0.51*   ANIONGAP  --   --   --   --   --  2* 5   SAI  --   --   --   --   --  8.0* 8.2*   GLC  --   --   --   --   --  89 119*   ALBUMIN  --   --   --   --   --  1.3* 1.5*   PROTTOTAL  --   --   --   --   --  5.7* 6.4*   BILITOTAL  --   --   --   --   --  0.1* 0.1*   ALKPHOS  --   --   --   --   --  60 74   ALT  --   --   --   --   --  11 8   AST  --   --   --   --   --  13 13   < > = values in this interval not displayed.       No results found for this or any previous visit (from the past 24 hour(s)).

## 2017-03-21 NOTE — PLAN OF CARE
Face to face report given with opportunity to observe patient.    Report given to Sarah D. RN Annelyse J. Stromberg   3/20/2017  7:21 PM

## 2017-03-21 NOTE — PLAN OF CARE
Problem: Goal Outcome Summary  Goal: Goal Outcome Summary  Outcome: No Change  Afebrile. Denies pain. IVF infusing. Turned and repositioned every 1-2hrs. Dressings intact to spine, L) hip and coccyx. Weak cough. Lung sounds at bases crackles. Encouraged coughing and deep breathing. Heel boots in place. Lethargic.

## 2017-03-21 NOTE — PLAN OF CARE
Problem: Goal Outcome Summary  Goal: Goal Outcome Summary  Outcome: No Change  Pt presented with VSS, O2 requiring 1L to maintain sats. Lungs dim with fine crackles in LLL. Bowel sounds active. Pt incontinent of bowel. Dressings to coccyx, back, and arms remain intact. Pt advanced to pureed, pudding thickened liquid diet, tolerating well but only eating a few ounces at a time. Pt up to the chair for most the day. Alert but disoriented to time, reorients well. Calls appropriately.      Temp: 99  F (37.2  C) Temp src: Tympanic BP: 112/60   Heart Rate: 94 Resp: 16 SpO2: 93 % O2 Device: Nasal cannula Oxygen Delivery: 1 LPM

## 2017-03-21 NOTE — PLAN OF CARE
Problem: Patient Goal: Rt Focus  Goal: 1. Patient Goal: RT Focus  Pt will return to baseline respiratory status   Pt weaned to 1lpm sats 93%.  BS diminished throughout, tx given as scheduled.

## 2017-03-21 NOTE — PLAN OF CARE
Problem: Goal Outcome Summary  Goal: Goal Outcome Summary  Outcome: Improving  Patient completed oral trials with pureed textures. Patient tolerated pureed textures with no overt s/sx of pen/asp. Patient needed increased time to swallow as he demonstrated a delay in initiation of pharyngeal swallow. Patient will need a constant feeder and pacing to ensure safety. Patient should be given frequent breaks to make sure that he is able to clear any residue in his mouth and pharyngeal cavity. All aspiration precautions should continue to be followed. Patient took 45 minutes to eat 3 oz of applesauce. At this point oral intake will not be sufficient to maintain nutrition. SLP recommends patient start on a pureed diet.

## 2017-03-21 NOTE — PLAN OF CARE
Problem: Patient Goal: Social Work Focus  Goal: 1. Patient Goal: Social Work Focus  Participated in Care Rounds, met with Scott. [email or web address] available for discharge planning.

## 2017-03-21 NOTE — PLAN OF CARE
Face to face report given with opportunity to observe patient.    Report given to Yee Perez   3/21/2017  7:27 AM

## 2017-03-22 NOTE — PLAN OF CARE
Problem: Goal Outcome Summary  Goal: Goal Outcome Summary  Outcome: Improving  Pt doing well. A&O today. VSS. Remains on 1L of O2, sats >92%. Lung sounds clear, coarse to RLL. Infrequent dry cough. Pt denies any pain. Foam dressings to spine changed this shift. Small open areas to lumbar spine, moist and pink, scant yellow/serosanguinous drainage. Pt voiding small amounts often. Tolerated pureed diet this shift, good appetite. Up in chair for meals, assist of 2. Repositioning often. Family visitors this afternoon.

## 2017-03-22 NOTE — PROGRESS NOTES
[facility]    Hospitalist Progress Note    Date of Service (when I saw the patient): 03/22/2017    Assessment & Plan   Scott Liang is a 81 year old male who was admitted on 3/13/2017.    1. Bilateral pneumonia: Patient came in to ER with extreme weakness. Chest CT & xray show bilateral infiltrates with air bronchogram. No real masses seen. No fevers, no elevated WBC. 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  Unable to get any sputum for analysis. Not overly symptomatic.  No distinct lymphadenopathy. Might require bronchoscopy eventually. Procalcitonin low at 1.3. Legionella antigen negative.  HIV negative. Blasto Leticia/histo Ag negative. Sputum MTB/RIF not detected. FNA cytology was negative. Not enough sample was obtained for fungal culture or gram stain/tissue culture. AFB stain and cultures from lung biopsy are yet pending. DDx including CAP versus inflammatory versus malignancy versus fungal?  - completed 8 days of ceftriaxone, azithromycin  - CT guided lung biopsy obtained 3/17/17, awaiting AFB stain/cultures still, but otherwise cytology was negative  - discussed with pulmonary at [facility] regarding possible utility of bronchoscopy. May not be high yield in setting of negative cytology on lung biopsy and completion of abx course. However, still cannot definitively rule out fungal infection. Awaiting [facility] pulmonologist to review patient's chest CT and radiographs  - given severe cachexia/malnutrition over just the past 1-2 months, still with heightened suspicion for malignancy. Consider CT AP to also eval for any evidence malignancy. However, swallow function still compromised and would be unable to do contrast study. Will hold off 1-2 days and see if swallow function improves enough to allow for contrast study  - wean O2 as able      2. Severe malnutrition: Low albumin. Severe loss of subcutaneous fat and  muscle mass. Weight is 86#. Extremely cachectic. Appetite improving. Has supplements and vitamins ordered. Inhibited by swallowing dysfunction. Improved swallow function and intake today, so will hold off on consideration for NG tube. Speech path to follow.      3. Dehydration: Better with IVF. Bp stable. Is starting to void more       4. Normocytic anemia: No bleeding here. Hgb has ranged 7.4-10. Currently 8.0. Iron studies suggestive of combination of iron deficiency and anemia of chronic disease. Likely iron deficiency is nutritional deficiency.    5. Cachexia: Unclear if is just malnutrition, or malignancy hiding some where. No palpable masses or nodes any where. Will undoubtedly need SNF for recovery after DC from here.    DVT Prophylaxis: Pneumatic Compression Devices    Code Status: Full Code    Disposition: Expected discharge in 1-2 days once able to establish PCP, plan for ongoing nutrition and arrangements for possible bronchoscopy    Peripheral IV 03/17/17 Left Lower forearm (Active)   Site Assessment WDL 3/22/2017  7:48 AM   Line Status Infusing;Checked every 1-2 hour 3/22/2017  7:48 AM   Phlebitis Scale 0-->no symptoms 3/22/2017  7:48 AM   Infiltration Scale 0 3/22/2017  7:48 AM   Extravasation? No 3/21/2017  8:30 AM   Number of days:5       Wound 03/15/17 Thoracic spine Shear injury superfial sheering (Active)   Site Assessment Pink;Moist 3/22/2017  7:49 AM   Anh-wound Assessment WDL 3/22/2017  7:49 AM   Size - Length x Width x Depth (cm) 2 areas #1- 1.5x1.5cm  and #2 1.5x1.3cm 3/15/2017  8:02 AM   Drainage Amount Small 3/22/2017  7:49 AM   Drainage Color/Charcteristics Yellow;Serosanguinous 3/22/2017  7:49 AM   Wound Care/Cleansing Barrier applied  3/22/2017  7:49 AM   Dressing Foam 3/22/2017  7:49 AM   Dressing Status New dressing 3/22/2017  7:49 AM   Number of days:7     Line/device assessment(s) completed for medical necessity      Everardo Weller    Interval History   No complaints. Appetite  improving. No respiratory distress. No cough, no fevers. Ongoing generalized weakness    -Data reviewed today: I reviewed all new labs and imaging results over the last 24 hours. I personally reviewed no images or EKG's today.    Physical Exam   Temp: 96  F (35.6  C) Temp src: Oral BP: 115/65   Heart Rate: 84 Resp: 20 SpO2: 93 % O2 Device: Nasal cannula Oxygen Delivery: 1 LPM  Vitals:    03/20/17 0600 03/21/17 0616 03/22/17 0636   Weight: 42 kg (92 lb 9.5 oz) 40.9 kg (90 lb 2.7 oz) 39.2 kg (86 lb 6.7 oz)     Vital Signs with Ranges  Temp:  [96  F (35.6  C)-99  F (37.2  C)] 96  F (35.6  C)  Heart Rate:  [82-99] 84  Resp:  [18-20] 20  BP: (115-137)/(58-74) 115/65  SpO2:  [92 %-97 %] 93 %      Intake/Output Summary (Last 24 hours) at 03/19/17 1223  Last data filed at 03/19/17 1155   Gross per 24 hour   Intake              763 ml   Output              710 ml   Net               53 ml   Constitutional - AA, NAD, cachectic  HEENT - atraumatic, normocephalic  Neck - supple, no masses, no JVD  CVS - S1 S2 RRR, no murmurs, rubs, gallops  Respiratory - crackles b/l   GI - cachectic, soft, NT, ND, + bowel sounds, no organomegaly  Musculoskeletal - no LE edema, thin, cachectic  Neuro - oriented x 2, no gross focal deficits     Medications     dextrose 5% and 0.45% NaCl + KCl 20 mEq/L 75 mL/hr at 03/21/17 1610       multivitamin, therapeutic with minerals  1 tablet Oral Daily     sodium chloride (PF)  3 mL Intracatheter Q8H     heparin sodium PF  5,000 Units Subcutaneous Q12H     ipratropium - albuterol 0.5 mg/2.5 mg/3 mL  3 mL Nebulization 4x daily     pneumococcal vaccine  0.5 mL Intramuscular Prior to discharge     influenza Vac Split High-Dose  0.5 mL Intramuscular Prior to discharge       Data     Recent Labs  Lab 03/22/17  0512 03/21/17  0554 03/20/17  0545  03/17/17  1615  03/16/17  0506   WBC 5.6 6.1 6.5  < >  --   < > 7.2   HGB 8.0* 8.5* 8.2*  < >  --   < > 7.4*   MCV 87 88 87  < >  --   < > 90    251 287  < >   --   < > 199   INR  --   --   --   --  1.30*  --   --    NA  --   --   --   --   --   --  140   POTASSIUM  --   --   --   --   --   --  4.0   CHLORIDE  --   --   --   --   --   --  100   CO2  --   --   --   --   --   --  38*   BUN  --   --   --   --   --   --  12   CR  --   --   --   --   --   --  0.47*   ANIONGAP  --   --   --   --   --   --  2*   SAI  --   --   --   --   --   --  8.0*   GLC  --   --   --   --   --   --  89   ALBUMIN  --   --   --   --   --   --  1.3*   PROTTOTAL  --   --   --   --   --   --  5.7*   BILITOTAL  --   --   --   --   --   --  0.1*   ALKPHOS  --   --   --   --   --   --  60   ALT  --   --   --   --   --   --  11   AST  --   --   --   --   --   --  13   < > = values in this interval not displayed.       Recent Results (from the past 24 hour(s))   XR Chest Port 1 View    Narrative    AP PORTABLE CHEST    FINDINGS: The cardiac silhouette is normal.  There is cicatricial  change in both upper lobes extending from the sarah to the lung apices  causing hilar elevation, right greater than left.  The cicatricial  change has caused deviation of the trachea towards the right.  Homogenous moderate opacities are present at both lung apices, with  embedded bullae on the right.  The right mid and lower lung zones are  essentially clear, with blunting of the right costophrenic angle.  On  the left there is hazy, relatively patchy opacity in the mid lung  zone, and blunting of the let costophrenic angle.    IMPRESSION:  COMPARISON IS MADE TO THE MARCH 16, 2017 EXAMINATION.  THE OVERALL AMOUNT OF OPACITY IN THE LEFT MID AND LOWER LUNG ZONE HAS  CHANGED IN DISTRIBUTION, NOW INVOLVING MORE OF THE MID LUNG REGION.  THE AMOUNT OF LEFT COSTOPHRENIC ANGLE BLUNTING HAS IMPROVED.  THE  RIGHT COSTOPHRENIC ANGLE BLUNTING HAS WORSENED.  THE APPEARANCE OF THE  RIGHT HEMITHORAX IS STABLE.  THE FINDINGS ARE COMPATIBLE WITH  BILATERAL FIBROSIS INVOLVING THE UPPER LOBES, WITH SLIGHT INTERVAL  IMPROVEMENT IN THE ACUTE  PROCESS INVOLVING THE LEFT MID AND LOWER LUNG  ZONE.  THERE DOES APPEAR TO BE A NEW SMALL RIGHT-SIDED PLEURAL  EFFUSION.  Exam Date: Mar 21, 2017 04:45:00 PM  Author: RACHEL ARCEO  This report is preliminary and transcribed

## 2017-03-22 NOTE — PROGRESS NOTES
[facility]    Hospitalist Progress Note    Date of Service (when I saw the patient): 03/21/2017    Assessment & Plan   Scott Liang is a 81 year old male who was admitted on 3/13/2017.    1. Bilateral pneumonia: Patient came in to ER with extreme weakness. Chest CT & xray show bilateral infiltrates with air bronchogram. No real masses seen. No fevers, no elevated WBC. 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  Unable to get any sputum for analysis. Not overly symptomatic.  No distinct lymphadenopathy. Might require bronchoscopy eventually. Procalcitonin low at 1.3. Legionella antigen negative.  HIV negative. Blasto Leticia/histo Ag negative. Sputum MTB/RIF not detected. FNA cytology was negative. Not enough sample was obtained for fungal culture or gram stain/tissue culture. AFB stain and cultures from lung biopsy are yet pending. DDx including CAP versus inflammatory versus malignancy versus fungal?  - completed 8 days of ceftriaxone, azithromycin - so will discontinue  - CT guided lung biopsy obtained 3/17/17, awaiting AFB stain/cultures still, but otherwise cytology was negative  - will confer with pulmonary tomorrow regarding possible bronchoscopy to further evaluate  - given severe cachexia/malnutrition over just the past 1-2 months, still with heightened suspicion for malignancy. Consider CT AP to also eval for any evidence malignancy. However, swallow function still compromised and would be unable to do contrast study. Will hold off 1-2 days and see if swallow function improves enough to allow for contrast study  - wean O2 as able      2. Severe malnutrition: Low albumin Weight is 79#. Extremely cachectic. Is eating very little.  Has supplements and vitamins ordered. Inhibited by swallowing dysfunction. Took 45 minutes to eat 3 oz applesauce today. May need NG until swallow function improves. Will address tomorrow.       3. Dehydration: Better with IVF. Bp stable. Is starting to void more       4. Normocytic anemia: No bleeding here. Hgb has ranged 7.4-10. Currently 8.5. Suspect anemia of chronic disease. Consider further iron studies    5. Cachexia: Unclear if is just malnutrition, or malignancy hiding some where. No palpable masses or nodes any where. Will undoubtedly need SNF for recovery after DC from here.    DVT Prophylaxis: Pneumatic Compression Devices    Code Status: Full Code    Disposition: Expected discharge in 1-2 days once able to establish PCP, plan for ongoing nutrition and arrangements for possible bronchoscopy    Peripheral IV 03/17/17 Left Lower forearm (Active)   Site Assessment WDL 3/21/2017  4:00 PM   Line Status Infusing 3/21/2017  4:00 PM   Phlebitis Scale 0-->no symptoms 3/21/2017  4:00 PM   Infiltration Scale 0 3/21/2017  4:00 PM   Extravasation? No 3/21/2017  8:30 AM   Number of days:4       Wound 03/15/17 Thoracic spine Shear injury superfial sheering (Active)   Site Assessment UTV 3/21/2017  4:15 PM   Anh-wound Assessment Other (Comment) 3/21/2017  4:15 PM   Size - Length x Width x Depth (cm) 2 areas #1- 1.5x1.5cm  and #2 1.5x1.3cm 3/15/2017  8:02 AM   Drainage Amount UTV 3/21/2017  4:15 PM   Wound Care/Cleansing Barrier applied  3/18/2017  9:35 PM   Dressing Foam 3/21/2017  4:15 PM   Dressing Status Clean, dry, intact 3/21/2017  4:15 PM   Number of days:6     Line/device assessment(s) completed for medical necessity      Everardo Weller    Interval History   No complaints. Overall generalized weakness. No other interval complaints. Stable. No appetite    -Data reviewed today: I reviewed all new labs and imaging results over the last 24 hours. I personally reviewed no images or EKG's today.    Physical Exam   Temp: 99  F (37.2  C) Temp src: Tympanic BP: 137/74   Heart Rate: 99 Resp: 18 SpO2: 95 % O2 Device: Nasal cannula Oxygen Delivery: 1 LPM  Vitals:    03/19/17 0636 03/20/17 0600 03/21/17 0616    Weight: 43.1 kg (95 lb 0.3 oz) 42 kg (92 lb 9.5 oz) 40.9 kg (90 lb 2.7 oz)     Vital Signs with Ranges  Temp:  [96.3  F (35.7  C)-99  F (37.2  C)] 99  F (37.2  C)  Heart Rate:  [79-99] 99  Resp:  [16-20] 18  BP: (102-137)/(57-74) 137/74  SpO2:  [93 %-98 %] 95 %      Intake/Output Summary (Last 24 hours) at 03/19/17 1223  Last data filed at 03/19/17 1155   Gross per 24 hour   Intake              763 ml   Output              710 ml   Net               53 ml   Constitutional - AA, NAD, cachectic  HEENT - atraumatic, normocephalic  Neck - supple, no masses, no JVD  CVS - S1 S2 RRR, no murmurs, rubs, gallops  Respiratory - crackles b/l   GI - cachectic, soft, NT, ND, + bowel sounds, no organomegaly  Musculoskeletal - no LE edema, thin, cachectic  Neuro - oriented x 2, no gross focal deficits     Medications     dextrose 5% and 0.45% NaCl + KCl 20 mEq/L 75 mL/hr at 03/21/17 1610       multivitamin, therapeutic with minerals  1 tablet Oral Daily     sodium chloride (PF)  3 mL Intracatheter Q8H     heparin sodium PF  5,000 Units Subcutaneous Q12H     ipratropium - albuterol 0.5 mg/2.5 mg/3 mL  3 mL Nebulization 4x daily     pneumococcal vaccine  0.5 mL Intramuscular Prior to discharge     influenza Vac Split High-Dose  0.5 mL Intramuscular Prior to discharge       Data     Recent Labs  Lab 03/21/17  0554 03/20/17  0545 03/19/17  0532  03/17/17  1615  03/16/17  0506   WBC 6.1 6.5 6.3  < >  --   < > 7.2   HGB 8.5* 8.2* 9.4*  < >  --   < > 7.4*   MCV 88 87 89  < >  --   < > 90    287 236  < >  --   < > 199   INR  --   --   --   --  1.30*  --   --    NA  --   --   --   --   --   --  140   POTASSIUM  --   --   --   --   --   --  4.0   CHLORIDE  --   --   --   --   --   --  100   CO2  --   --   --   --   --   --  38*   BUN  --   --   --   --   --   --  12   CR  --   --   --   --   --   --  0.47*   ANIONGAP  --   --   --   --   --   --  2*   SAI  --   --   --   --   --   --  8.0*   GLC  --   --   --   --   --   --  89    ALBUMIN  --   --   --   --   --   --  1.3*   PROTTOTAL  --   --   --   --   --   --  5.7*   BILITOTAL  --   --   --   --   --   --  0.1*   ALKPHOS  --   --   --   --   --   --  60   ALT  --   --   --   --   --   --  11   AST  --   --   --   --   --   --  13   < > = values in this interval not displayed.       No results found for this or any previous visit (from the past 24 hour(s)).

## 2017-03-22 NOTE — PLAN OF CARE
Face to face report given with opportunity to observe patient.    Report given to Carol R., RN Annelyse J. Stromberg   3/22/2017  3:01 PM

## 2017-03-22 NOTE — PLAN OF CARE
Problem: Goal Outcome Summary  Goal: Goal Outcome Summary  Outcome: No Change  VSS.  LLL fine crackles on lung auscultation.  SPO2 93% on 1 LPM NC.  Dressing to coccyx changed, skin nonblanchable.  Dressings to spine is CDI.  Turned and repositioned q 2 hrs.  Incontinent of bowel and bladder.  2 stools, dark brown in color.  Blue heel boots intact to RLE and LLE.      Face to face report given with opportunity to observe patient.    Report given to VESTA Jacobs.    Elise Holly   3/22/2017  7:09 AM

## 2017-03-22 NOTE — PLAN OF CARE
Face to face report given with opportunity to observe patient.    Report given to VESTA Olivares.     Yee Gonzalez   3/21/2017  7:13 PM

## 2017-03-22 NOTE — PLAN OF CARE
Problem: Goal Outcome Summary  Goal: Goal Outcome Summary  Outcome: Improving  COMMUNICATION: Able to communicate wants and needs  DIET: Pureed with Honey thick liquids  STRATEGIES FOR SAFE SWALLOW: Patient will need a constant feeder and pacing to ensure safety. Patient should be given frequent breaks to make sure that he is able to clear any residue in his mouth and pharyngeal cavity. All aspiration precautions and goo oral cares should continue to be followed.   COGNITION: Deficits noted  COMMENTS: Patient completed oral trials with pureed textures and honey liquid. Patient tolerated trials with no overt s/sx of pen/asp. Patient needed increased time to swallow as he demonstrated a delay in initiation of pharyngeal swallow. Patient easily fatigued. Patient took 30 minutes to eat 2 oz pudding and 1 oz honey juice. At this point oral intake will not be sufficient to maintain nutrition. Speech will continue to follow throughout stay.

## 2017-03-22 NOTE — PLAN OF CARE
Problem: Patient Goal: Social Work Focus  Goal: 1. Patient Goal: Social Work Focus  Participated in care rounds and met with Scott and his family. He has been accepted at [facility] in [place] and Scott and the family are in agreement with this plan. Will continue to remain available for discharge planning.

## 2017-03-23 NOTE — PLAN OF CARE
Problem: Goal Outcome Summary  Goal: Goal Outcome Summary  Outcome: No Change  Pt is pleasant and cooperative. He is emaciated and to weak to eat. He needs total cares. Skin and nutrition are key to any improvement. Possible bronchoscopy pending to identify respiratory compromise.     Face to face report given with opportunity to observe patient.     Report given to Gertrudis Davis   3/23/2017  8:03 AM       Problem: Pneumonia (Adult)  Goal: Signs and Symptoms of Listed Potential Problems Will be Absent or Manageable (Pneumonia)  Signs and symptoms of listed potential problems will be absent or manageable by discharge/transition of care (reference Pneumonia (Adult) CPG).   Outcome: Improving    03/23/17 0751   Pneumonia   Problems Assessed (Pneumonia) all   Problems Present (Pneumonia) other (see comments)  (undetermined disease process)         Problem: Skin Integrity Impairment, Risk/Actual (Adult)  Goal: Identify Related Risk Factors and Signs and Symptoms  Related risk factors and signs and symptoms are identified upon initiation of Human Response Clinical Practice Guideline (CPG)   Outcome: No Change    03/23/17 0751   Skin Integrity Impairment, Risk/Actual   Skin Integrity Impairment, Risk/Actual: Related Risk Factors age extremes;fluid/nutrition status;immobility   Signs and Symptoms (Skin Integrity Impairment) erythema nonblanchable

## 2017-03-23 NOTE — PLAN OF CARE
Problem: Pneumonia (Adult)  Goal: Signs and Symptoms of Listed Potential Problems Will be Absent or Manageable (Pneumonia)  Signs and symptoms of listed potential problems will be absent or manageable by discharge/transition of care (reference Pneumonia (Adult) CPG).   Outcome: Improving    03/23/17 1642   Pneumonia   Problems Assessed (Pneumonia) all   Problems Present (Pneumonia) respiratory compromise      Pt lungs clear and diminished. Pt still requiring O2 at 1L. Pt sat in chair for breakfast and lunch. No antibiotics. MD was going to speak with pulmonology today to see what they had to say. Pt does not complain about anything. Very cooperative. Appears alert and oriented but forgetful. Needs direction when getting up and walking with walker to chair. Denies pain. Appetite ok. Eats little. Coughs afterwards but states no to choking. CT ordered. Passed to afternoons.     Face to face report given with opportunity to observe patient.     Report given to Jennifer DEVLIN   3/23/2017  4:48 PM           Problem: Skin Integrity Impairment, Risk/Actual (Adult)  Goal: Skin Integrity/Wound Healing  Patient will demonstrate the desired outcomes by discharge/transition of care.   Outcome: No Change  Pt dressing are clean and intact. Coccyx one changed after stool wiped away. Pt continues to be turn and change every 2 hours. Barrier cream applied to inner groin redness.

## 2017-03-23 NOTE — PLAN OF CARE
Problem: Patient Goal: Rt Focus  Goal: 1. Patient Goal: RT Focus  Pt will return to baseline respiratory status   Outcome: No Change  Patient still requiring O2 at 1L.  Tried off O2 and SPO2 83%.  Breath sounds diminished. Nebs changed to PRN.

## 2017-03-23 NOTE — PROGRESS NOTES
Follow up.  Chart reviewed/speech rx notes reviewed.      NDDI diet with honey thick liquids ordered.  Pt needs to be fed all meals.  Intake remains poor as requires slow feeding.  Ensure Enlive supplement is ordered along with multivitamin with minerals.      Note plans for d/c soon to SNF.  May still need to consider enteral feeding as current intake will not be adequate to improve malnourished state.  RD will follow medical plan.

## 2017-03-23 NOTE — PLAN OF CARE
Problem: Patient Goal: Social Work Focus  Goal: 1. Patient Goal: Social Work Focus  Participated in care rounds, met with Scott. Will remain available for possible discharge to [facility]

## 2017-03-23 NOTE — PLAN OF CARE
Face to face report given with opportunity to observe patient.    Report given to VESTA Mccann   3/22/2017  7:13 PM

## 2017-03-23 NOTE — PLAN OF CARE
Problem: Goal Outcome Summary  Goal: Goal Outcome Summary  Outcome: Therapy, progress toward functional goals as expected  Patient tolerated pureed textures and honey thick liquids. Not recommended to upgrade today as patient was a little gurgly post swallow. SLP will continue to follow.

## 2017-03-24 NOTE — PLAN OF CARE
Problem: Goal Outcome Summary  Goal: Goal Outcome Summary  Outcome: Therapy, unable to show any progress toward functional goals  Pt placed on NPO status due to continued dysphagia. Chest with fine crackles and diminished breath sounds. Cough weak and ineffective. Wearing 1LNC at discharge. Foam dressings patent to spine with application of new foam to coccyx darkened area- no open skin noted. Foam pads to L hip bones. Protective heel boots bilat. Elbows with few scabs to R- cut slipper socks on for protection. [place] drainage straw yellow- 600cc emptied. Had been turned 1-2 hrs in bed with pillows for support. Affect flat - pt very quiet- denies pain, N/T or nausea. To [place] via ambulance for further studies in stable condition. Report given to drivers.

## 2017-03-24 NOTE — PLAN OF CARE
Problem: Patient Goal: Rt Focus  Goal: 1. Patient Goal: RT Focus  Pt will return to baseline respiratory status   Pt on 1lpm nc sats 92%.  BS diminished to clear throughout.  No resp distress, no nebs needed.

## 2017-03-24 NOTE — PROGRESS NOTES
Name: Scott Liang    MRN#: [id number]    Reason for Hospitalization: Malnutrition (H) [E46]  Community acquired bacterial pneumonia [J15.9]  Emphysema, unspecified (H) [J43.9]  Weight loss [R63.4]    Discharge Date: 03/24/2017    Patient / Family response to discharge plan: agreeable, brother Los in agreement as well.    Follow-Up Appt: Future Appointments  Date Time Provider Department Center   3/29/2017 10:30 AM Lisa Reed MD [facility]       Other Providers (Care Coordinator, County Services, PCA services etc): No    Discharge Disposition: [facility] via ALS ambulance    [facility]

## 2017-03-24 NOTE — PLAN OF CARE
Patient discharged at 2:40 PM via ambulance accompanied by drivers and staff. Prescriptions - None ordered for discharge. All belongings sent with patient.     Discharge instructions reviewed . Listed belongings gathered and returned to patient.     Patient discharged to [facility]   Report called to  Ana Rosa LEMONS    Core Measures and Vaccines  Core Measures applicable during stay: Yes. If yes, state diagnosis: pneumonia  Pneumonia and Influenza given prior to discharge, if indicated: N/A    Surgical Patient   Surgical Procedures during stay:  na  Did patient receive discharge instruction on wound care and recognition of infection symptoms? Yes    MISC  Follow up appointment made:  No to [facility] aquired medications returned to patient: N/A  Patient reports pain was well managed at discharge: Yes

## 2017-03-24 NOTE — PROGRESS NOTES
[facility]    Hospitalist Progress Note    Date of Service (when I saw the patient): 03/24/2017    Assessment & Plan   Scott Liang is a 81 year old male who was admitted on 3/13/2017.    1. Bilateral pneumonia: Patient came in to ER with extreme weakness. Chest CT & xray show bilateral infiltrates with air bronchogram. No real masses seen. No fevers, no elevated WBC. 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  Unable to get any sputum for analysis. Not overly symptomatic.  No distinct lymphadenopathy. Might require bronchoscopy eventually. Procalcitonin low at 1.3. Legionella antigen negative.  HIV negative. Blasto Leticia/histo Ag negative. Sputum MTB/RIF not detected. FNA cytology was negative. Not enough sample was obtained for fungal culture or gram stain/tissue culture. AFB stain and cultures from lung biopsy are yet pending. DDx including CAP versus inflammatory versus malignancy versus fungal?  - completed 8 days of ceftriaxone, azithromycin  - CT guided lung biopsy obtained 3/17/17, awaiting AFB stain/cultures still, but otherwise cytology was negative  - discussed with pulmonary at [facility] (Dr. Gao) regarding possible utility of bronchoscopy. He reviewed patient's images and agreed that bronchoscopy would be indicated. Will plan to schedule with [facility] pulmonary clinic as outpatient. Degree of bronchiectasis and bilateral upper lobe involvement on the CT suggestive of TB, however, sputum MTB/RIF negative. Lung biopsy AFB stain negative for acid fast organism and AFB cultures still pending. Per recommendation of pulmonary, will place Quantiferon gold order (do not have TSPOT available).   - given severe cachexia/malnutrition over just the past 1-2 months, still with heightened suspicion for malignancy, will plan to get CT AP with IV contrast to also eval for any evidence malignancy. Swallow function still  compromised, prohibiting oral contrast  - wean O2 as able      2. Severe malnutrition: Low albumin. Severe loss of subcutaneous fat and muscle mass. Weight is 86#. Extremely cachectic. Appetite improving. Has supplements and vitamins ordered. Inhibited by swallowing dysfunction. Slowly improving swallow function. Speech path to follow.      3. Dehydration: Better with IVF. Bp stable. Is starting to void more       4. Normocytic anemia: No bleeding here. Hgb has ranged 7.4-10. Currently 8.0. Iron studies suggestive of combination of iron deficiency and anemia of chronic disease. Likely iron deficiency is nutritional deficiency.    5. Cachexia: Unclear if is just malnutrition, or malignancy hiding some where. No palpable masses or nodes any where. Will undoubtedly need SNF for recovery after DC from here.    DVT Prophylaxis: Pneumatic Compression Devices    Code Status: Full Code    Disposition: Expected discharge tomorrow    Peripheral IV 03/23/17 Right Upper forearm (Active)   Site Assessment WDL 3/24/2017  1:25 AM   Line Status Infusing;Checked every 1-2 hour 3/24/2017  1:25 AM   Phlebitis Scale 0-->no symptoms 3/24/2017  1:25 AM   Infiltration Scale 0 3/24/2017  1:25 AM   Extravasation? No 3/24/2017  1:25 AM   Dressing Intervention Dressing reinforced 3/24/2017  1:25 AM   Number of days:1       Urethral Catheter 16 fr (Active)   Catheter Care Done 3/24/2017  7:00 AM   Collection Container Standard 3/24/2017  7:00 AM   Securement Method Securing device (Describe) 3/24/2017  7:00 AM   Rationale for Continued Use Retention 3/24/2017  7:00 AM   Number of days:0       Wound 03/15/17 Thoracic spine Shear injury superfial sheering (Active)   Site Assessment UT 3/24/2017  1:25 AM   Anh-wound Assessment UT 3/24/2017  1:25 AM   Size - Length x Width x Depth (cm) 2 areas 3/22/2017  7:37 PM   Drainage Amount UTV 3/24/2017  1:25 AM   Drainage Color/Charcteristics UTV 3/24/2017  1:25 AM   Wound Care/Cleansing Barrier applied   3/22/2017  7:49 AM   Dressing Foam 3/24/2017  1:25 AM   Dressing Status Clean, dry, intact 3/24/2017  1:25 AM   Number of days:9     Line/device assessment(s) completed for medical necessity      Everardo GREGORIO Weller    Interval History   No complaints. Appetite still slowy improving. No respiratory distress. No cough, no fevers. Ongoing generalized weakness    -Data reviewed today: I reviewed all new labs and imaging results over the last 24 hours. I personally reviewed no images or EKG's today.    Physical Exam   Temp: 98.2  F (36.8  C) Temp src: Tympanic BP: 102/51   Heart Rate: 82 Resp: 16 SpO2: 96 % O2 Device: Nasal cannula Oxygen Delivery: 1 LPM (turned down to 1/2 L )  Vitals:    03/22/17 0636 03/23/17 0614 03/24/17 0535   Weight: 39.2 kg (86 lb 6.7 oz) 39.9 kg (87 lb 15.4 oz) 39.2 kg (86 lb 6.7 oz)     Vital Signs with Ranges  Temp:  [97.6  F (36.4  C)-99  F (37.2  C)] 98.2  F (36.8  C)  Heart Rate:  [79-89] 82  Resp:  [16-22] 16  BP: (102-116)/(51-61) 102/51  SpO2:  [83 %-97 %] 96 %      Intake/Output Summary (Last 24 hours) at 03/19/17 1223  Last data filed at 03/19/17 1155   Gross per 24 hour   Intake              763 ml   Output              710 ml   Net               53 ml   Constitutional - AA, NAD, cachectic  HEENT - atraumatic, normocephalic  Neck - supple, no masses, no JVD  CVS - S1 S2 RRR, no murmurs, rubs, gallops  Respiratory - crackles b/l   GI - cachectic, soft, NT, ND, + bowel sounds, no organomegaly  Musculoskeletal - no LE edema, thin, cachectic  Neuro - oriented x 2, no gross focal deficits     Medications     dextrose 5% and 0.45% NaCl + KCl 20 mEq/L 75 mL/hr at 03/24/17 0126       multivitamin, therapeutic with minerals  1 tablet Oral Daily     sodium chloride (PF)  3 mL Intracatheter Q8H     heparin sodium PF  5,000 Units Subcutaneous Q12H     pneumococcal vaccine  0.5 mL Intramuscular Prior to discharge     influenza Vac Split High-Dose  0.5 mL Intramuscular Prior to discharge       Data      Recent Labs  Lab 03/24/17  0513 03/23/17  1730 03/23/17  0517  03/17/17  1615   WBC 7.1 7.6 7.2  < >  --    HGB 7.5* 8.6* 8.0*  < >  --    MCV 85 86 86  < >  --     257 297  < >  --    INR  --   --   --   --  1.30*    133  --   --   --    POTASSIUM 4.0 4.3  --   --   --    CHLORIDE 93* 92*  --   --   --    CO2 39* 38*  --   --   --    BUN 10 9  --   --   --    CR 0.45* 0.43*  --   --   --    ANIONGAP 2* 3  --   --   --    SAI 8.1* 8.2*  --   --   --    * 87  --   --   --    < > = values in this interval not displayed.       Recent Results (from the past 24 hour(s))   CT Abdomen Pelvis w Contrast    Narrative    CT SCAN OF ABDOMEN AND PELVIS WITH IV CONTRAST    REPORT: There are bibasilar pleural effusions.  Confluent opacities  are seen in the right lower lobe.  These are suspicious for pneumonia.  The liver is free of masses.  There is no biliary duct enlargement.  The patient appears to have undergone cholecystectomy. The spleen and  pancreas appear normal. There are no adrenal masses. There is dilation  of both renal collecting systems and a markedly extended bladder,  extending above the umbilicus.  The hydronephrosis is most likely due  to bladder distention.  The periaortic lymph nodes are normal in  caliber. The rectum appears normal.  There is a compression fracture  of the L1 vertebra of uncertain age.  Degenerative changes present  throughout the lumbar spine, most severe at L5-S1.    IMPRESSION:  1.  MARKEDLY DISTENDED BLADDER WITH BILATERAL HYDRONEPHROSIS AND  HYDROURETER, MOST LIKELY DUE TO THE BLADDER DILATION.    2.  BILATERAL PLEURAL EFFUSIONS.    3.  CONFLUENT ALVEOLAR OPACITY IN THE RIGHT LOWER LOBE, SUSPICIOUS FOR  PNEUMONIA.                        SIGNATURE PAGE ONLY  Exam Date: Mar 23, 2017 07:59:00 PM  Author: SUNNI KISER  This report is preliminary and transcribed

## 2017-03-24 NOTE — PLAN OF CARE
"Problem: Goal Outcome Summary  Goal: Goal Outcome Summary  Outcome: No Change  Pt disoriented to time and a little bit forgetful during assessment. He has been on 1 LPm via NC. Wounds covered with foam bandages and remain clean, dry and intact. Bony prominences cushioned with pillows, heel boots in use during shift and removed for assessment. Continue to be a turn and reposition q 2 hours as well as incontinence cares at these intervals. He continues to be incontinent of both stool and urine during shift. He had no c/o pain, no c/o SOB and no c/o n&v. He did not have much oral intake stating \" he was just not hungry \". IVF continue per MAR. At approx: 2300 he was bladder scanned and found to have greater than 999 according to the bladder scanner. Straight cathed for 800 cc urine. Pt states \" he felt better \" after being straight cathed. He was up in the chair for meal with an assist of 2. He continues to have a weak, nonprodictive, infrequent cough. LS diminished. CT performed this shift. VSS. He has remained free from all falls and/or injuries during shift.      Face to face report given with opportunity to observe patient.     Report given to Tomas Leslie RN   3/24/2017  1:27 AM           Problem: Pneumonia (Adult)  Goal: Signs and Symptoms of Listed Potential Problems Will be Absent or Manageable (Pneumonia)  Signs and symptoms of listed potential problems will be absent or manageable by discharge/transition of care (reference Pneumonia (Adult) CPG).   Outcome: No Change    03/23/17 1642   Pneumonia   Problems Assessed (Pneumonia) all   Problems Present (Pneumonia) respiratory compromise           "

## 2017-03-24 NOTE — PLAN OF CARE
Patient bladder scanned for >999, Dr. Weller updated. Order for hernandez catheter placed. Catheter inserted with 500 ml yellow output. CAUTI handout discussed with patient and charted in education.     Face to face report given with opportunity to observe patient.    Report given to Tete Oneal   3/24/2017  7:43 AM

## 2017-03-24 NOTE — PLAN OF CARE
Problem: Goal Outcome Summary  Goal: Goal Outcome Summary  COMMUNICATION: Pt able to make needs known verbally  DIET: NPO  STRATEGIES FOR SAFE SWALLOW: NPO and oral cares  COGNITION: not addressed  COMMENTS: patient declining in swallowing ability.  Patient not able to tolerate SLP feed oral trials of pureed or honey with physical prompt for head position with swallow.  Patient demonstrating wet-gurgly cough on all trials.  Cough unproductive.  SLP confident patient aspirating solids and liquids.  Speech Therapy will continue to follow and provide bolus trials.

## 2017-03-24 NOTE — PROGRESS NOTES
[facility]    Hospitalist Progress Note    Date of Service (when I saw the patient): 03/23/2017    Assessment & Plan   Scott Liang is a 81 year old male who was admitted on 3/13/2017.     1. Bilateral pneumonia: Patient came in to ER with extreme weakness. Chest CT & xray show bilateral infiltrates with air bronchogram. No real masses seen. No fevers, no elevated WBC. 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 Unable to get any sputum for analysis. Not overly symptomatic. No distinct lymphadenopathy. Might require bronchoscopy eventually. Procalcitonin low at 1.3. Legionella antigen negative. HIV negative. Blasto Leticia/histo Ag negative. Sputum MTB/RIF not detected. FNA cytology was negative. Not enough sample was obtained for fungal culture or gram stain/tissue culture. AFB stain and cultures from lung biopsy are yet pending. DDx including CAP versus inflammatory versus malignancy versus fungal?  - completed 8 days of ceftriaxone, azithromycin  - CT guided lung biopsy obtained 3/17/17, awaiting AFB stain/cultures still, but otherwise cytology was negative  - discussed with pulmonary at [facility] (Dr. Gao) regarding possible utility of bronchoscopy. He reviewed patient's images and agreed that bronchoscopy would be indicated. Will plan to schedule with [facility] pulmonary clinic as outpatient. Degree of bronchiectasis and bilateral upper lobe involvement on the CT suggestive of TB, however, sputum MTB/RIF negative. Lung biopsy AFB stain negative for acid fast organism and AFB cultures still pending. Per recommendation of pulmonary, will place Quantiferon gold order (do not have TSPOT available).   - given severe cachexia/malnutrition over just the past 1-2 months, still with heightened suspicion for malignancy, will plan to get CT AP with IV contrast to also eval for any evidence malignancy. Swallow function still  compromised, prohibiting oral contrast  - wean O2 as able      2. Severe malnutrition: Low albumin. Severe loss of subcutaneous fat and muscle mass. Weight is 86#. Extremely cachectic. Appetite improving. Has supplements and vitamins ordered. Inhibited by swallowing dysfunction. Slowly improving swallow function. Speech path to follow.       3. Dehydration: Better with IVF. Bp stable. Is starting to void more       4. Normocytic anemia: No bleeding here. Hgb has ranged 7.4-10. Currently 8.0. Iron studies suggestive of combination of iron deficiency and anemia of chronic disease. Likely iron deficiency is nutritional deficiency.     5. Cachexia: Unclear if is just malnutrition, or malignancy hiding some where. No palpable masses or nodes any where. Will undoubtedly need SNF for recovery after DC from here.     DVT Prophylaxis: Pneumatic Compression Devices     Code Status: Full Code     Disposition: Expected discharge tomorrow    Peripheral IV 03/23/17 Right Upper forearm (Active)   Number of days:0       Wound 03/15/17 Thoracic spine Shear injury superfial sheering (Active)   Site Assessment UT 3/23/2017  4:00 PM   Anh-wound Assessment [facility] 3/23/2017  4:00 PM   Size - Length x Width x Depth (cm) 2 areas 3/22/2017  7:37 PM   Drainage Amount UTV 3/23/2017  4:00 PM   Drainage Color/Charcteristics Yellow;Serosanguinous 3/22/2017  7:49 AM   Wound Care/Cleansing Barrier applied  3/22/2017  7:49 AM   Dressing Foam 3/23/2017  4:00 PM   Dressing Status Clean, dry, intact 3/23/2017  7:18 AM   Number of days:8     Line/device assessment(s) completed for medical necessity      Everardo Weller    Interval History   No complaints. Appetite improving. No respiratory distress. No cough, no fevers. Ongoing generalized weakness    -Data reviewed today: I reviewed all new labs and imaging results over the last 24 hours. I personally reviewed no images or EKG's today.    Physical Exam   Temp: 99  F (37.2  C) Temp src: Tympanic BP:  103/59   Heart Rate: 89 Resp: 22 SpO2: (!) 89 % O2 Device: Nasal cannula Oxygen Delivery: 1/2 LPM (unknown who turned down o2, turned up to 1 LPM)  Vitals:    03/21/17 0616 03/22/17 0636 03/23/17 0614   Weight: 40.9 kg (90 lb 2.7 oz) 39.2 kg (86 lb 6.7 oz) 39.9 kg (87 lb 15.4 oz)     Vital Signs with Ranges  Temp:  [96.9  F (36.1  C)-99  F (37.2  C)] 99  F (37.2  C)  Heart Rate:  [76-89] 89  Resp:  [18-22] 22  BP: (103-116)/(45-61) 103/59  SpO2:  [83 %-97 %] 89 %      Intake/Output Summary (Last 24 hours) at 03/19/17 1223  Last data filed at 03/19/17 1155   Gross per 24 hour   Intake              763 ml   Output              710 ml   Net               53 ml   Constitutional - AA, NAD, cachectic  HEENT - atraumatic, normocephalic  Neck - supple, no masses, no JVD  CVS - S1 S2 RRR, no murmurs, rubs, gallops  Respiratory - crackles b/l   GI - cachectic, soft, NT, ND, + bowel sounds, no organomegaly  Musculoskeletal - no LE edema, thin, cachectic  Neuro - oriented x 2, no gross focal deficits     Medications     dextrose 5% and 0.45% NaCl + KCl 20 mEq/L 75 mL/hr at 03/23/17 0704       multivitamin, therapeutic with minerals  1 tablet Oral Daily     sodium chloride (PF)  3 mL Intracatheter Q8H     heparin sodium PF  5,000 Units Subcutaneous Q12H     pneumococcal vaccine  0.5 mL Intramuscular Prior to discharge     influenza Vac Split High-Dose  0.5 mL Intramuscular Prior to discharge       Data     Recent Labs  Lab 03/23/17  1730 03/23/17  0517 03/22/17  0512  03/17/17  1615   WBC 7.6 7.2 5.6  < >  --    HGB 8.6* 8.0* 8.0*  < >  --    MCV 86 86 87  < >  --     297 257  < >  --    INR  --   --   --   --  1.30*     --   --   --   --    POTASSIUM 4.3  --   --   --   --    CHLORIDE 92*  --   --   --   --    CO2 38*  --   --   --   --    BUN 9  --   --   --   --    CR 0.43*  --   --   --   --    ANIONGAP 3  --   --   --   --    SAI 8.2*  --   --   --   --    GLC 87  --   --   --   --    < > = values in this  interval not displayed.       No results found for this or any previous visit (from the past 24 hour(s)).

## 2017-03-24 NOTE — PROGRESS NOTES
Name: Scott Liang    MRN#: [id number]    Reason for Hospitalization: Malnutrition (H) [E46]  Community acquired bacterial pneumonia [J15.9]  Emphysema, unspecified (H) [J43.9]  Weight loss [R63.4]    Discharge Date: 03/24/2017    Patient / Family response to discharge plan: brother Los Liang expressed agreement    Follow-Up Appt: Future Appointments  Date Time Provider Department Center   3/29/2017 10:30 AM Lisa Reed MD [facility] [place]       Other Providers (Care Coordinator, County Services, PCA services etc): No    Discharge Disposition: transferred to [facility] in [facility]

## 2017-03-24 NOTE — PLAN OF CARE
Problem: Goal Outcome Summary  Goal: Goal Outcome Summary  Outcome: No Change  T&R q2h. Patient A&O. Afebrile. VSS. Patient remained on 1 L O2 majority of night with sat's in mid 90's. Turned down to 1/2 L around 4 am. Lungs dim with crackles in bilateral lower lobes Denied SOB. Complained of general body pains upon initial assessment but denied tylenol. IV running at 75 cc/hr. IV site WDL. Foam dressings to spine remain CDI. Heel protector boots remain on. Patient denied fluids when they were offered throughout the night. Patient has not voided at this time, but was straight cathed right at shift change. Call light within reach.

## 2017-03-28 NOTE — DISCHARGE SUMMARY
[facility]    Discharge Summary  Hospitalist    Date of Admission:  3/13/2017  Date of Transfer:  3/24/2017  2:38 PM  Transferring Provider: Everardo Weller  Date of Service (when I saw the patient): 3/24/17    Discharge Diagnoses   1. Bilateral pneumonia, rule out possible bacterial or fungal infection versus occult malignancy, versus inflammatory disorder  2. Severe malnutrition  3. Dehydration  4. Normocytic anemia, combination of nutritional iron deficiency and probable anemia of chronic disease  5. Urinary retention  6. Severe deconditioning/failure to thrive    History of Present Illness   Mr. Scott Liang is a 81-year-old gentleman who was brought to the ER by his brother for evaluation of weakness, weight loss as well as a cough. Mr. Liang has really never been in to see any health care providers for most of his life. He lives with a brother here in [place]. In speaking with Mr. Liang, he is not very forthcoming with a lot of information, but from what the ER physician said, he has been living with his brother and has not been out of bed for days and just been progressively weaker and weaker over the last months. He has had no recent travel at all. He just feels very weak and not much appetite. Has not had any chest pains, denies any real shortness of breath, cough, purulent sputum, no hemoptysis, no headache, double vision, denies any abdominal pain. No trouble swallowing. Denies any diarrhea or constipation, no dysuria, no hematuria at all. No peripheral edema at all. No skin rashes. Denies any sore joints at all. As I said, he really denies any big issues at all. For the most part says he does have food at the home, he has just not been hungry at all or eaten.       In the ER here, he was hemodynamically stable. He is relatively hypotensive. He was afebrile. Sats were actually okay when he initially came to 92%, but they did drop down somewhat during the ER stay here, has been on a  couple liters of O2. He has had no fevers here. In the ER he got a little bit of fluid and got some antibiotics, got some Rocephin and azithromycin. He is admitted up here now for further evaluation.       In speaking with the patient, he is not very forthcoming, answers very simple questions but denies anything other than just feeling weak. He is extremely cachectic and has lost a lot of weight. He is not sure how much he has lost or over what time period, but he has been just very weak and was unable to get out of bed for the last week or so at least. Otherwise, I cannot get any real good history from him at all. His brother is not available for any further information.         Hospital Course   Scott Liang was admitted on 3/13/2017.  The following problems were addressed during his hospitalization:  Scott Liang is a 81 year old male who was admitted on 3/13/2017.      1. Bilateral pneumonia: Patient came in to ER with extreme weakness and declining functional status over the past 2-3 months. 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 Chest CT & xray show bilateral infiltrates with air bronchogram.  There is significant bronchiectasis in the billateral upper lobes, somewhat nodular alveolar consolidation in the lower lobes. No real masses seen. No fevers, no elevated WBC. 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 Have been unable to get any sputum for analysis. Not overly symptomatic. No distinct lymphadenopathy.  Procalcitonin low at 1.3. No repeat procalcitonin. Legionella antigen negative. HIV negative. Blasto Leticia/histo Ag negative. Induced sputum MTB/RIF not detected. FNA cytology was negative. Not enough sample was obtained on biopsy for fungal culture or gram stain/tissue culture. AFB stain and cultures from lung biopsy are yet pending on transfer. The differential  includes CAP (seems less likely given no improvement after completion of 8 day course of ceftriaxone and azithromycin) versus inflammatory versus malignancy versus fungal. His functional status has not improved and now appears to be getting weaker. Starting to develop congested cough now. Had made some progress in gaining swallow strength, but is once again NPO due to significant weakness even with swallowing. Have Discussed with [facility], who has graciously agreed to accept the patient in transfer for further subspecialty evaluation. He is too weak to discharge with outpatient workup. He will likely need pulmonary and infectious disease consultation. I have already been in conversation with Dr. Gao, [facility] pulmonologist, regarding likely need for bronchoscopy to further evaluate. Patient has also had Quantiferon gold order placed, but not resulted yet upon transfer.       2. Severe malnutrition: Low albumin. He has severe loss of subcutaneous fat and muscle mass. Weight is 86#. Extremely cachectic. Appetite had been improving for a couple days, but now swallow function has again weakened to the point of prohibiting further oral intake due to aspiration risk. Has supplements and vitamins ordered. Inhibited by swallowing dysfunction. Will need ongoing speech pathology evaluation.      3. Dehydration: Better with IVF. Bp stable. Is starting to void more       4. Normocytic anemia: No bleeding here. Hgb has ranged 7.4-10. Currently 7.5 upon transfer. Iron studies suggestive of combination of iron deficiency and anemia of chronic disease. Likely iron deficiency is nutritional deficiency.      5. Cachexia: Unclear if is just malnutrition, or malignancy hiding some where. No palpable masses or nodes any where.     6. Urinary retention:  Bladder scan revealed >1L urine. Coronado catheter placed prior to transfer.         Pending Results   Unresulted Labs Ordered in the Past 30 Days of this Admission      Date and Time Order Name Status Description    3/17/2017 1108 Cytology non gyn In process     3/16/2017 1535 AFB Culture Non Blood Preliminary           Code Status   Full Code       Primary Care Physician   None    Physical Exam                     Vitals:    03/22/17 0636 03/23/17 0614 03/24/17 0535   Weight: 39.2 kg (86 lb 6.7 oz) 39.9 kg (87 lb 15.4 oz) 39.2 kg (86 lb 6.7 oz)     Vital Signs with Ranges       Constitutional - AA, NAD, cachectic  HEENT - atraumatic, normocephalic  Neck - supple, no masses, no JVD  CVS - S1 S2 RRR, no murmurs, rubs, gallops  Respiratory - crackles b/l   GI - cachectic, soft, NT, ND, + bowel sounds, no organomegaly  Musculoskeletal - no LE edema, thin, cachectic  Neuro - oriented x 2, no gross focal deficits     Discharge Disposition   Transferred to [facility] at discharge: Stable    Consultations This Hospital Stay   SWALLOW EVAL SPEECH PATH AT BEDSIDE IP CONSULT    Time Spent on this Encounter   I, Everardo Weller, personally saw the patient today and spent greater than 30 minutes discharging this patient.    Discharge Orders       Allergies   No Known Allergies  Data   Most Recent 3 CBC's:  Recent Labs   Lab Test  03/24/17   0513  03/23/17   1730  03/23/17   0517   WBC  7.1  7.6  7.2   HGB  7.5*  8.6*  8.0*   MCV  85  86  86   PLT  281  257  297      Most Recent 3 BMP's:  Recent Labs   Lab Test  03/24/17   0513  03/23/17   1730  03/16/17   0506   NA  134  133  140   POTASSIUM  4.0  4.3  4.0   CHLORIDE  93*  92*  100   CO2  39*  38*  38*   BUN  10  9  12   CR  0.45*  0.43*  0.47*   ANIONGAP  2*  3  2*   SAI  8.1*  8.2*  8.0*   GLC  101*  87  89     Most Recent 2 LFT's:  Recent Labs   Lab Test  03/16/17   0506  03/14/17   0520   AST  13  13   ALT  11  8   ALKPHOS  60  74   BILITOTAL  0.1*  0.1*     Most Recent INR's and Anticoagulation Dosing History:  Anticoagulation Dose History     Recent Dosing and Labs Latest Ref Rng & Units 3/14/2017 3/17/2017    INR 0.80 -  1.20 1.25(H) 1.30(H)        Most Recent 3 Troponin's:No lab results found.  Most Recent Cholesterol Panel:No lab results found.  Most Recent 6 Bacteria Isolates From Any Culture (See EPIC Reports for Culture Details):  Recent Labs   Lab Test  03/17/17   1515  03/15/17   0930  03/14/17   0051  03/13/17   1728  03/13/17   1720  03/13/17   1710   CULT  Culture received and in progress.  Positive AFB results are called as soon as   detected.  Final report to follow in 7 to 8 weeks.  Assayed at [email or web address] [address]    Light growth Candida albicans Predominating  Normal respiratory sindi reduced  *  No MRSA isolated  No growth after 6 days  No growth after 6 days  50,000 to 100,000 colonies/mL Mixed bacterial sindi No further identification or   sensitivity done  *     Most Recent TSH, T4 and A1c Labs:  Recent Labs   Lab Test  03/13/17   1320   TSH  3.34     Results for orders placed or performed during the hospital encounter of 03/13/17   XR Chest Port 1 View    Narrative    CHEST SINGLE VIEW    FINDINGS:  There are confluent opacities in both lungs in the upper  portion.  Bilateral apical pleural thickening is noted.  Interstitial  thickening is also seen, worse on the left than the right.  Upward  retraction of sarah are seen bilaterally.    IMPRESSION:  CONFLUENT OPACITIES IN BOTH LUNGS, PARTICULARLY ON THE  LEFT.  THERE IS A NODULAR APPEARANCE WHICH WOULD BE SUSPICIOUS FOR  MALIGNANCY.  I WOULD RECOMMEND A CHEST CT BE OBTAINED.  Exam Date: Mar 13, 2017 02:29:30 PM  Author: SUNNI KISER  This report is final and signed     CT Chest w Contrast    Narrative    CHEST CT    COMPARISON:  Today's study is compared to a prior chest x-ray from  earlier the same day.    FINDINGS:  Severe emphysematous changes are seen throughout both  lungs.  There are patchy areas of alveolar consolidation seen  throughout the lingula and left lower lobe.  Calcified plaque  formation is seen about the posterior  aspects of the right  hemithorax.    There is consolidation along the lateral segment of the left upper  lobe, associated with bronchiectasis.  Additionally, there is  peripheral consolidation and marked bronchiectasis seen throughout the  right upper lobe.  Lymph nodes of the mediastinum are mildly  prominent.  There is no evidence of an acute fracture.  Visualized  portions of the upper abdomen are unremarkable.    IMPRESSION:  1.  SEVERE EMPHYSEMA WITH MULTIFOCAL PNEUMONIA, LIKELY CHRONIC WITHIN  THE UPPER LOBES.    2.  SOMEWHAT NODULAR ALVEOLAR CONSOLIDATION SEEN WITHIN THE LOWER  LOBES.  THE POSSIBILITY OF NEOPLASM IS NOT COMPLETELY EXCLUDED.    Report edited to add contrast charge. 3-14-17 DI                        SIGNATURE PAGE ONLY  Exam Date: Mar 13, 2017 03:42:00 PM  Author: LILIANA PISANO  This report is corrected and signed     XR Chest Port 1 View    Narrative    CHEST    REPORT:  Widespread interstitial thickening is noted, with severe  apical confluent opacities and upward retraction of both sarah is seen.  There is retraction of the trachea from left-to-right.  Heart is  normal in size.    IMPRESSION:  WORSENING LEFT LUNG OPACITIES AS COMPARED TO MARCH 13, 2017.  NO CHANGE IN THE APPEARANCE OF THE RIGHT LUNG.  Exam Date: Mar 16, 2017 05:53:08 AM  Author: SUNNI KISER  This report is final and signed     CT Lung Mediastinum Biopsy    Narrative    CT-GUIDED LUNG BIOPSY    TECHNIQUE:  The left chest was prepped and draped in a sterile manner.  Using lidocaine anesthesia, a 22-gauge needle was placed into the  left upper lobe.  Three separate aspirates were obtained and sent for  cytopathology as well as culture and sensitivity.  The patient  tolerated the procedure well.  Exam Date: Mar 17, 2017 06:36:59 PM  Author: SUNNI KISER  This report is final and signed     XR Post Procedure Imaging    Narrative    POST-PROCEDURE CHEST IMAGING    REPORT:  There is abnormal increased density in both  lungs, stable  from previous examination on March 16, 2017.  No pneumothorax is seen  following lung biopsy.  Exam Date: Mar 17, 2017 03:36:00 PM  Author: SUNNI KISER  This report is final and signed     XR Chest Port 1 View    Narrative    AP PORTABLE CHEST    FINDINGS: The cardiac silhouette is normal.  There is cicatricial  change in both upper lobes extending from the sarah to the lung apices  causing hilar elevation, right greater than left.  The cicatricial  change has caused deviation of the trachea towards the right.  Homogenous moderate opacities are present at both lung apices, with  embedded bullae on the right.  The right mid and lower lung zones are  essentially clear, with blunting of the right costophrenic angle.  On  the left there is hazy, relatively patchy opacity in the mid lung  zone, and blunting of the let costophrenic angle.    IMPRESSION:  COMPARISON IS MADE TO THE MARCH 16, 2017 EXAMINATION.  THE OVERALL AMOUNT OF OPACITY IN THE LEFT MID AND LOWER LUNG ZONE HAS  CHANGED IN DISTRIBUTION, NOW INVOLVING MORE OF THE MID LUNG REGION.  THE AMOUNT OF LEFT COSTOPHRENIC ANGLE BLUNTING HAS IMPROVED.  THE  RIGHT COSTOPHRENIC ANGLE BLUNTING HAS WORSENED.  THE APPEARANCE OF THE  RIGHT HEMITHORAX IS OTHERWISE STABLE.  THE FINDINGS ARE COMPATIBLE  WITH BILATERAL FIBROSIS INVOLVING THE UPPER LOBES, WITH SLIGHT  INTERVAL IMPROVEMENT IN THE ACUTE PROCESS INVOLVING THE LEFT MID AND  LOWER LUNG ZONE.  THERE DOES APPEAR TO BE A NEW SMALL RIGHT-SIDED  PLEURAL EFFUSION.  Exam Date: Mar 21, 2017 04:45:00 PM  Author: RACHEL ARCEO  This report is final and signed     CT Abdomen Pelvis w Contrast    Narrative    CT SCAN OF ABDOMEN AND PELVIS WITH IV CONTRAST    REPORT: There are bibasilar pleural effusions.  Confluent opacities  are seen in the right lower lobe.  These are suspicious for pneumonia.  The liver is free of masses.  There is no biliary duct enlargement.  The patient appears to have undergone  cholecystectomy. The spleen and  pancreas appear normal. There are no adrenal masses. There is dilation  of both renal collecting systems and a markedly extended bladder,  extending above the umbilicus.  The hydronephrosis is most likely due  to bladder distention.  The periaortic lymph nodes are normal in  caliber. The rectum appears normal.  There is a compression fracture  of the L1 vertebra of uncertain age.  Degenerative changes present  throughout the lumbar spine, most severe at L5-S1.    IMPRESSION:  1.  MARKEDLY DISTENDED BLADDER WITH BILATERAL HYDRONEPHROSIS AND  HYDROURETER, MOST LIKELY DUE TO THE BLADDER DILATION.    2.  BILATERAL PLEURAL EFFUSIONS.    3.  CONFLUENT ALVEOLAR OPACITY IN THE RIGHT LOWER LOBE, SUSPICIOUS FOR  PNEUMONIA.                        SIGNATURE PAGE ONLY  Exam Date: Mar 23, 2017 07:59:00 PM  Author: SUNNI KISER  This report is final and signed

## 2017-05-11 NOTE — PROGRESS NOTES
Participated in care rounds this morning.  Discharge planning is on hold at the time being.  Plan for rehab.  [facility] aware of Scott and hold on to his information.  SS/CM will continue to remain available.    Patient had blood work done last week and has not received any results yet. He would like the results to take to his appointment with a different provider. Please call patient back.

## 2017-05-17 LAB
Lab: NORMAL
MICRO REPORT STATUS: NORMAL
MYCOBACTERIUM SPEC CULT: NORMAL
SPECIMEN SOURCE: NORMAL
